# Patient Record
Sex: MALE | Race: WHITE | NOT HISPANIC OR LATINO | Employment: OTHER | URBAN - METROPOLITAN AREA
[De-identification: names, ages, dates, MRNs, and addresses within clinical notes are randomized per-mention and may not be internally consistent; named-entity substitution may affect disease eponyms.]

---

## 2017-11-30 PROBLEM — Z12.11 SCREENING FOR MALIGNANT NEOPLASM OF COLON: Status: ACTIVE | Noted: 2017-11-30

## 2019-09-09 ENCOUNTER — TELEPHONE (OUTPATIENT)
Dept: FAMILY MEDICINE | Facility: CLINIC | Age: 64
End: 2019-09-09

## 2019-09-09 DIAGNOSIS — E11.9 TYPE 2 DIABETES MELLITUS WITHOUT COMPLICATION, WITHOUT LONG-TERM CURRENT USE OF INSULIN: Primary | ICD-10-CM

## 2019-09-10 LAB
ALBUMIN SERPL-MCNC: 4.2 G/DL (ref 3.6–5.1)
ALBUMIN/GLOB SERPL: 1.3 (CALC) (ref 1–2.5)
ALP SERPL-CCNC: 63 U/L (ref 40–115)
ALT SERPL-CCNC: 30 U/L (ref 9–46)
AST SERPL-CCNC: 18 U/L (ref 10–35)
BASOPHILS # BLD AUTO: 38 CELLS/UL (ref 0–200)
BASOPHILS NFR BLD AUTO: 0.6 %
BILIRUB SERPL-MCNC: 0.7 MG/DL (ref 0.2–1.2)
BUN SERPL-MCNC: 13 MG/DL (ref 7–25)
BUN/CREAT SERPL: ABNORMAL (CALC) (ref 6–22)
CALCIUM SERPL-MCNC: 9.5 MG/DL (ref 8.6–10.3)
CHLORIDE SERPL-SCNC: 101 MMOL/L (ref 98–110)
CHOLEST SERPL-MCNC: 172 MG/DL
CHOLEST/HDLC SERPL: 3.7 (CALC)
CLIENT CONTACT:: NORMAL
CO2 SERPL-SCNC: 30 MMOL/L (ref 20–32)
COMMENT: NORMAL
CREAT SERPL-MCNC: 1.12 MG/DL (ref 0.7–1.25)
EOSINOPHIL # BLD AUTO: 139 CELLS/UL (ref 15–500)
EOSINOPHIL NFR BLD AUTO: 2.2 %
ERYTHROCYTE [DISTWIDTH] IN BLOOD BY AUTOMATED COUNT: 12.7 % (ref 11–15)
GFRSERPLBLD MDRD-ARVRAT: 69 ML/MIN/1.73M2
GLOBULIN SER CALC-MCNC: 3.2 G/DL (CALC) (ref 1.9–3.7)
GLUCOSE SERPL-MCNC: 163 MG/DL (ref 65–99)
HBA1C MFR BLD: 7.9 % OF TOTAL HGB
HCT VFR BLD AUTO: 48.3 % (ref 38.5–50)
HDLC SERPL-MCNC: 46 MG/DL
HGB BLD-MCNC: 16.2 G/DL (ref 13.2–17.1)
LDLC SERPL CALC-MCNC: 98 MG/DL (CALC)
LYMPHOCYTES # BLD AUTO: 1928 CELLS/UL (ref 850–3900)
LYMPHOCYTES NFR BLD AUTO: 30.6 %
Lab: NORMAL
MCH RBC QN AUTO: 30.6 PG (ref 27–33)
MCHC RBC AUTO-ENTMCNC: 33.5 G/DL (ref 32–36)
MCV RBC AUTO: 91.1 FL (ref 80–100)
MONOCYTES # BLD AUTO: 668 CELLS/UL (ref 200–950)
MONOCYTES NFR BLD AUTO: 10.6 %
NEUTROPHILS # BLD AUTO: 3528 CELLS/UL (ref 1500–7800)
NEUTROPHILS NFR BLD AUTO: 56 %
NONHDLC SERPL-MCNC: 126 MG/DL (CALC)
PLATELET # BLD AUTO: 217 THOUSAND/UL (ref 140–400)
PMV BLD REES-ECKER: 10.1 FL (ref 7.5–12.5)
POTASSIUM SERPL-SCNC: 4.5 MMOL/L (ref 3.5–5.3)
PROT SERPL-MCNC: 7.4 G/DL (ref 6.1–8.1)
RBC # BLD AUTO: 5.3 MILLION/UL (ref 4.2–5.8)
REF LAB TEST NAME: NORMAL
REF LAB TEST: NORMAL
SODIUM SERPL-SCNC: 137 MMOL/L (ref 135–146)
TRIGL SERPL-MCNC: 183 MG/DL
WBC # BLD AUTO: 6.3 THOUSAND/UL (ref 3.8–10.8)

## 2019-09-16 ENCOUNTER — TELEPHONE (OUTPATIENT)
Dept: FAMILY MEDICINE | Facility: CLINIC | Age: 64
End: 2019-09-16

## 2019-09-16 ENCOUNTER — OFFICE VISIT (OUTPATIENT)
Dept: FAMILY MEDICINE | Facility: CLINIC | Age: 64
End: 2019-09-16
Payer: MEDICARE

## 2019-09-16 VITALS
WEIGHT: 241 LBS | DIASTOLIC BLOOD PRESSURE: 80 MMHG | HEIGHT: 72 IN | SYSTOLIC BLOOD PRESSURE: 124 MMHG | HEART RATE: 68 BPM | BODY MASS INDEX: 32.64 KG/M2

## 2019-09-16 DIAGNOSIS — Z87.898 H/O MOTION SICKNESS: ICD-10-CM

## 2019-09-16 DIAGNOSIS — E78.2 MIXED HYPERLIPIDEMIA: ICD-10-CM

## 2019-09-16 DIAGNOSIS — E11.9 TYPE 2 DIABETES MELLITUS WITHOUT COMPLICATION, WITHOUT LONG-TERM CURRENT USE OF INSULIN: ICD-10-CM

## 2019-09-16 DIAGNOSIS — K21.9 GASTROESOPHAGEAL REFLUX DISEASE WITHOUT ESOPHAGITIS: ICD-10-CM

## 2019-09-16 PROCEDURE — 99214 OFFICE O/P EST MOD 30 MIN: CPT | Mod: S$GLB,,, | Performed by: FAMILY MEDICINE

## 2019-09-16 PROCEDURE — 99214 PR OFFICE/OUTPT VISIT, EST, LEVL IV, 30-39 MIN: ICD-10-PCS | Mod: S$GLB,,, | Performed by: FAMILY MEDICINE

## 2019-09-16 RX ORDER — INDOMETHACIN 50 MG/1
50 CAPSULE ORAL 2 TIMES DAILY
COMMUNITY
Start: 2018-02-28 | End: 2020-05-20 | Stop reason: SDUPTHER

## 2019-09-16 RX ORDER — METFORMIN HYDROCHLORIDE 500 MG/1
500 TABLET ORAL 2 TIMES DAILY WITH MEALS
Qty: 60 TABLET | Refills: 3 | Status: SHIPPED | OUTPATIENT
Start: 2019-09-16 | End: 2019-12-19 | Stop reason: SINTOL

## 2019-09-16 RX ORDER — SCOLOPAMINE TRANSDERMAL SYSTEM 1 MG/1
1 PATCH, EXTENDED RELEASE TRANSDERMAL
Qty: 4 PATCH | Refills: 1 | Status: SHIPPED | OUTPATIENT
Start: 2019-09-16 | End: 2020-03-04

## 2019-09-16 NOTE — TELEPHONE ENCOUNTER
Sent message to portal that we have received his lab results and will review at office visit this week.

## 2019-09-16 NOTE — PROGRESS NOTES
SUBJECTIVE:    Patient ID: Jacob Lou is a 63 y.o. male.    Chief Complaint: Follow-up    This 63-year-old male has just returned from a recent trip to [place].  He was eating plenty of carbs on this trip he states I love carbs he has a positive family history for diabetes mellitus.  In today's A1c has risen to 7.9.    He has had no recent gout attacks in the last 6 months.    He has a history of an old right calcaneal fracture but it only hurts occasionally and he does have to use a walking stick intermittently.      Orders Only on 09/09/2019   Component Date Value Ref Range Status    Cholesterol 09/09/2019 172  <200 mg/dL Final    HDL 09/09/2019 46  >40 mg/dL Final    Triglycerides 09/09/2019 183* <150 mg/dL Final    LDL Cholesterol 09/09/2019 98  mg/dL (calc) Final    Hdl/Cholesterol Ratio 09/09/2019 3.7  <5.0 (calc) Final    Non HDL Chol. (LDL+VLDL) 09/09/2019 126  <130 mg/dL (calc) Final    Glucose 09/09/2019 163* 65 - 99 mg/dL Final    BUN, Bld 09/09/2019 13  7 - 25 mg/dL Final    Creatinine 09/09/2019 1.12  0.70 - 1.25 mg/dL Final    eGFR if non African American 09/09/2019 69  > OR = 60 mL/min/1.73m2 Final    eGFR if African American 09/09/2019 79  > OR = 60 mL/min/1.73m2 Final    BUN/Creatinine Ratio 09/09/2019 NOT APPLICABLE  6 - 22 (calc) Final    Sodium 09/09/2019 137  135 - 146 mmol/L Final    Potassium 09/09/2019 4.5  3.5 - 5.3 mmol/L Final    Chloride 09/09/2019 101  98 - 110 mmol/L Final    CO2 09/09/2019 30  20 - 32 mmol/L Final    Calcium 09/09/2019 9.5  8.6 - 10.3 mg/dL Final    Total Protein 09/09/2019 7.4  6.1 - 8.1 g/dL Final    Albumin 09/09/2019 4.2  3.6 - 5.1 g/dL Final    Globulin, Total 09/09/2019 3.2  1.9 - 3.7 g/dL (calc) Final    Albumin/Globulin Ratio 09/09/2019 1.3  1.0 - 2.5 (calc) Final    Total Bilirubin 09/09/2019 0.7  0.2 - 1.2 mg/dL Final    Alkaline Phosphatase 09/09/2019 63  40 - 115 U/L Final    AST 09/09/2019 18  10 - 35 U/L Final     ALT 09/09/2019 30  9 - 46 U/L Final    WBC 09/09/2019 6.3  3.8 - 10.8 Thousand/uL Final    RBC 09/09/2019 5.30  4.20 - 5.80 Million/uL Final    Hemoglobin 09/09/2019 16.2  13.2 - 17.1 g/dL Final    Hematocrit 09/09/2019 48.3  38.5 - 50.0 % Final    Mean Corpuscular Volume 09/09/2019 91.1  80.0 - 100.0 fL Final    Mean Corpuscular Hemoglobin 09/09/2019 30.6  27.0 - 33.0 pg Final    Mean Corpuscular Hemoglobin Conc 09/09/2019 33.5  32.0 - 36.0 g/dL Final    RDW 09/09/2019 12.7  11.0 - 15.0 % Final    Platelets 09/09/2019 217  140 - 400 Thousand/uL Final    MPV 09/09/2019 10.1  7.5 - 12.5 fL Final    Neutrophils Absolute 09/09/2019 3,528  1,500 - 7,800 cells/uL Final    Lymph # 09/09/2019 1,928  850 - 3,900 cells/uL Final    Mono # 09/09/2019 668  200 - 950 cells/uL Final    Eos # 09/09/2019 139  15 - 500 cells/uL Final    Baso # 09/09/2019 38  0 - 200 cells/uL Final    Neutrophils Relative 09/09/2019 56  % Final    Lymph% 09/09/2019 30.6  % Final    Mono% 09/09/2019 10.6  % Final    Eosinophil% 09/09/2019 2.2  % Final    Basophil% 09/09/2019 0.6  % Final    Test(s) Ordered on Requisition 09/09/2019 CBC,CMP,A1C,LIPID   Final    Test Code: 09/09/2019 6399,91931,496,0549   Final    Client Contact: 09/09/2019 OMKAR JULIO   Final    Comments: 09/09/2019    Final    Comment 09/09/2019    Final    Hemoglobin A1C 09/09/2019 7.9* <5.7 % of total Hgb Final       Past Medical History:   Diagnosis Date    GERD (gastroesophageal reflux disease)     Hyperlipidemia      Past Surgical History:   Procedure Laterality Date    cataracts  2019    Dr MARYANN Martinez    COLONOSCOPY N/A 11/30/2017    Performed by Maikel Medina MD at [facility]    FOOT SURGERY      HERNIA REPAIR       Family History   Problem Relation Age of Onset    Diabetes Mother     Hypertension Father        Marital Status:   Alcohol History:  reports that he drinks about 0.6 oz of alcohol per week.  Tobacco History:  reports that he has  never smoked. He has never used smokeless tobacco.  Drug History:  reports that he does not use drugs.    Review of patient's allergies indicates:  No Known Allergies    Current Outpatient Medications:     indomethacin (INDOCIN) 50 MG capsule, Take 50 mg by mouth 2 (two) times daily., Disp: , Rfl:     ibuprofen (ADVIL,MOTRIN) 200 MG tablet, Take 200 mg by mouth every 8 (eight) hours as needed for Pain., Disp: , Rfl:     omeprazole (PRILOSEC) 20 MG capsule, Take 20 mg by mouth every other day., Disp: , Rfl:     rosuvastatin (CRESTOR) 10 MG tablet, Take 10 mg by mouth every other day., Disp: , Rfl:     Review of Systems   Constitutional: Negative for appetite change (I love  carbs!), chills, fatigue, fever and unexpected weight change.   HENT: Negative for congestion, ear pain and trouble swallowing.    Eyes: Negative for pain, discharge and visual disturbance.   Respiratory: Negative for apnea, cough, shortness of breath and wheezing.    Cardiovascular: Negative for chest pain and leg swelling.   Gastrointestinal: Negative for abdominal pain, blood in stool, constipation, diarrhea, nausea and vomiting.   Endocrine: Negative for cold intolerance, heat intolerance, polydipsia and polyuria.   Genitourinary: Negative for dysuria, hematuria, testicular pain and urgency.        Nocturia 1 x nite   Musculoskeletal: Negative for gait problem, joint swelling and myalgias.        Keeps a  Cane in his  Vehicle , rt heel post fracture pain   Neurological: Negative for dizziness, seizures and numbness.   Psychiatric/Behavioral: Negative for agitation, behavioral problems and hallucinations. The patient is not nervous/anxious.           Objective:      Vitals:    09/16/19 1114   BP: 124/80   Pulse: 68   Weight: 109.3 kg (241 lb)   Height: 6' (1.829 m)     Physical Exam   Constitutional: He is oriented to person, place, and time. He appears well-developed and well-nourished.   HENT:   Head: Normocephalic and atraumatic.   Right  Ear: External ear normal.   Left Ear: External ear normal.   Nose: Nose normal.   Mouth/Throat: Oropharynx is clear and moist.   Eyes: Pupils are equal, round, and reactive to light. EOM are normal.   Neck: Normal range of motion. Neck supple. Carotid bruit is not present. No thyromegaly present.   Cardiovascular: Normal rate, regular rhythm, normal heart sounds and intact distal pulses.   No murmur heard.  Pulmonary/Chest: Effort normal and breath sounds normal. He has no wheezes. He has no rales.   Abdominal: Soft. Bowel sounds are normal. He exhibits no distension. There is no hepatosplenomegaly. There is no tenderness.   Musculoskeletal: Normal range of motion. He exhibits no tenderness or deformity.        Lumbar back: Normal. He exhibits no pain and no spasm.   Bends 90 degrees at  waist   Lymphadenopathy:     He has no cervical adenopathy.   Neurological: He is alert and oriented to person, place, and time. No cranial nerve deficit. Coordination normal.   Skin: Skin is warm and dry. No rash noted.   Psychiatric: He has a normal mood and affect. His behavior is normal. Judgment and thought content normal.   Nursing note and vitals reviewed.        Assessment:       1. Type 2 diabetes mellitus without complication, without long-term current use of insulin    2. Mixed hyperlipidemia    3. H/O motion sickness    4. Gastroesophageal reflux disease without esophagitis         Plan:       Type 2 diabetes mellitus without complication, without long-term current use of insulin  A1c is 7.9.  We will add metformin 500 mg twice a day.  He is to reduce his carbs and tried lose 5 lb.  Return in 3 months for another A1c test  Mixed hyperlipidemia  Cholesterol is at goal at this time on rosuvastatin  H/O motion sickness  Scopolamine patches will be prescribed for an upcoming cruise  Gastroesophageal reflux disease without esophagitis  Controlled well on omeprazole    Follow up in about 3 months (around 12/16/2019) for  Diabetic Check-Up with PA/NP.

## 2019-09-16 NOTE — TELEPHONE ENCOUNTER
----- Message from Tyler Abel MD sent at 9/14/2019  3:24 PM CDT -----  Pt coming this week for  ov

## 2019-09-18 ENCOUNTER — PATIENT MESSAGE (OUTPATIENT)
Dept: FAMILY MEDICINE | Facility: CLINIC | Age: 64
End: 2019-09-18

## 2019-09-27 ENCOUNTER — PATIENT MESSAGE (OUTPATIENT)
Dept: FAMILY MEDICINE | Facility: CLINIC | Age: 64
End: 2019-09-27

## 2019-12-05 ENCOUNTER — TELEPHONE (OUTPATIENT)
Dept: FAMILY MEDICINE | Facility: CLINIC | Age: 64
End: 2019-12-05

## 2019-12-12 LAB
ALBUMIN SERPL-MCNC: 4.1 G/DL (ref 3.6–5.1)
ALBUMIN/GLOB SERPL: 1.3 (CALC) (ref 1–2.5)
ALP SERPL-CCNC: 60 U/L (ref 40–115)
ALT SERPL-CCNC: 42 U/L (ref 9–46)
AST SERPL-CCNC: 23 U/L (ref 10–35)
BASOPHILS # BLD AUTO: 0 CELLS/UL (ref 0–200)
BASOPHILS NFR BLD AUTO: 0 %
BILIRUB SERPL-MCNC: 0.7 MG/DL (ref 0.2–1.2)
BUN SERPL-MCNC: 9 MG/DL (ref 7–25)
BUN/CREAT SERPL: ABNORMAL (CALC) (ref 6–22)
CALCIUM SERPL-MCNC: 9.1 MG/DL (ref 8.6–10.3)
CHLORIDE SERPL-SCNC: 103 MMOL/L (ref 98–110)
CHOLEST SERPL-MCNC: 167 MG/DL
CHOLEST/HDLC SERPL: 4.1 (CALC)
CO2 SERPL-SCNC: 29 MMOL/L (ref 20–32)
CREAT SERPL-MCNC: 0.96 MG/DL (ref 0.7–1.25)
EOSINOPHIL # BLD AUTO: 118 CELLS/UL (ref 15–500)
EOSINOPHIL NFR BLD AUTO: 2 %
ERYTHROCYTE [DISTWIDTH] IN BLOOD BY AUTOMATED COUNT: 13 % (ref 11–15)
GFRSERPLBLD MDRD-ARVRAT: 83 ML/MIN/1.73M2
GLOBULIN SER CALC-MCNC: 3.1 G/DL (CALC) (ref 1.9–3.7)
GLUCOSE SERPL-MCNC: 156 MG/DL (ref 65–99)
HBA1C MFR BLD: 7.5 % OF TOTAL HGB
HCT VFR BLD AUTO: 43.5 % (ref 38.5–50)
HDLC SERPL-MCNC: 41 MG/DL
HGB BLD-MCNC: 15.3 G/DL (ref 13.2–17.1)
LDLC SERPL CALC-MCNC: 98 MG/DL (CALC)
LYMPHOCYTES # BLD AUTO: 2006 CELLS/UL (ref 850–3900)
LYMPHOCYTES NFR BLD AUTO: 34 %
MCH RBC QN AUTO: 31.5 PG (ref 27–33)
MCHC RBC AUTO-ENTMCNC: 35.2 G/DL (ref 32–36)
MCV RBC AUTO: 89.7 FL (ref 80–100)
MONOCYTES # BLD AUTO: 649 CELLS/UL (ref 200–950)
MONOCYTES NFR BLD AUTO: 11 %
NEUTROPHILS # BLD AUTO: 3127 CELLS/UL (ref 1500–7800)
NEUTROPHILS NFR BLD AUTO: 53 %
NONHDLC SERPL-MCNC: 126 MG/DL (CALC)
PATH REPORT.FINAL DX SPEC: NORMAL
PATHOLOGIST NAME: NORMAL
PLATELET # BLD AUTO: 232 THOUSAND/UL (ref 140–400)
PMV BLD REES-ECKER: 10.1 FL (ref 7.5–12.5)
POTASSIUM SERPL-SCNC: 4.5 MMOL/L (ref 3.5–5.3)
PROT SERPL-MCNC: 7.2 G/DL (ref 6.1–8.1)
RBC # BLD AUTO: 4.85 MILLION/UL (ref 4.2–5.8)
SERVICE CMNT-IMP: NORMAL
SODIUM SERPL-SCNC: 139 MMOL/L (ref 135–146)
SPECIMEN SOURCE: NORMAL
TRIGL SERPL-MCNC: 190 MG/DL
WBC # BLD AUTO: 5.9 THOUSAND/UL (ref 3.8–10.8)

## 2019-12-19 ENCOUNTER — OFFICE VISIT (OUTPATIENT)
Dept: FAMILY MEDICINE | Facility: CLINIC | Age: 64
End: 2019-12-19
Payer: MEDICARE

## 2019-12-19 VITALS
HEART RATE: 88 BPM | BODY MASS INDEX: 32.73 KG/M2 | WEIGHT: 241.63 LBS | DIASTOLIC BLOOD PRESSURE: 82 MMHG | HEIGHT: 72 IN | SYSTOLIC BLOOD PRESSURE: 126 MMHG

## 2019-12-19 DIAGNOSIS — K21.9 GASTROESOPHAGEAL REFLUX DISEASE WITHOUT ESOPHAGITIS: ICD-10-CM

## 2019-12-19 DIAGNOSIS — E78.2 MIXED HYPERLIPIDEMIA: ICD-10-CM

## 2019-12-19 DIAGNOSIS — E11.9 TYPE 2 DIABETES MELLITUS WITHOUT COMPLICATION, WITHOUT LONG-TERM CURRENT USE OF INSULIN: Primary | ICD-10-CM

## 2019-12-19 PROCEDURE — 99214 OFFICE O/P EST MOD 30 MIN: CPT | Mod: S$GLB,,, | Performed by: PHYSICIAN ASSISTANT

## 2019-12-19 PROCEDURE — 1159F PR MEDICATION LIST DOCUMENTED IN MEDICAL RECORD: ICD-10-PCS | Mod: S$GLB,,, | Performed by: PHYSICIAN ASSISTANT

## 2019-12-19 PROCEDURE — 1126F AMNT PAIN NOTED NONE PRSNT: CPT | Mod: S$GLB,,, | Performed by: PHYSICIAN ASSISTANT

## 2019-12-19 PROCEDURE — 1126F PR PAIN SEVERITY QUANTIFIED, NO PAIN PRESENT: ICD-10-PCS | Mod: S$GLB,,, | Performed by: PHYSICIAN ASSISTANT

## 2019-12-19 PROCEDURE — 99214 PR OFFICE/OUTPT VISIT, EST, LEVL IV, 30-39 MIN: ICD-10-PCS | Mod: S$GLB,,, | Performed by: PHYSICIAN ASSISTANT

## 2019-12-19 PROCEDURE — 1159F MED LIST DOCD IN RCRD: CPT | Mod: S$GLB,,, | Performed by: PHYSICIAN ASSISTANT

## 2019-12-19 RX ORDER — OMEPRAZOLE 20 MG/1
20 CAPSULE, DELAYED RELEASE ORAL EVERY OTHER DAY
Qty: 45 CAPSULE | Refills: 1 | Status: SHIPPED | OUTPATIENT
Start: 2019-12-19 | End: 2020-03-04 | Stop reason: SDUPTHER

## 2019-12-19 RX ORDER — METFORMIN HYDROCHLORIDE 750 MG/1
750 TABLET, EXTENDED RELEASE ORAL 2 TIMES DAILY WITH MEALS
Qty: 180 TABLET | Refills: 1 | Status: SHIPPED | OUTPATIENT
Start: 2019-12-19 | End: 2020-03-04 | Stop reason: SDUPTHER

## 2019-12-19 RX ORDER — ROSUVASTATIN CALCIUM 10 MG/1
10 TABLET, COATED ORAL EVERY OTHER DAY
Qty: 45 TABLET | Refills: 1 | Status: SHIPPED | OUTPATIENT
Start: 2019-12-19 | End: 2020-03-04 | Stop reason: SDUPTHER

## 2019-12-19 NOTE — PROGRESS NOTES
SUBJECTIVE:    Patient ID: Jacob Lou is a 64 y.o. male.    Chief Complaint: Diabetes (Diabetic check up....labs completed on 12/9/2019.....mlr)    64-year-old white male presents today for three-month diabetic checkup.  I reviewed Dr. Abel's note from September.  Metformin was added at low dose 500 mg b.i.d. and he was instructed to reduce carb intake and lose about 5 lb.  His A1c did reduce from 7.9-7.5%.  He is tolerating medication okay but does admit that some days he has some severe diarrhea sxs that he has associated with the  IR metformin.      Orders Only on 09/09/2019   Component Date Value Ref Range Status    Cholesterol 09/09/2019 172  <200 mg/dL Final    HDL 09/09/2019 46  >40 mg/dL Final    Triglycerides 09/09/2019 183* <150 mg/dL Final    LDL Cholesterol 09/09/2019 98  mg/dL (calc) Final    Hdl/Cholesterol Ratio 09/09/2019 3.7  <5.0 (calc) Final    Non HDL Chol. (LDL+VLDL) 09/09/2019 126  <130 mg/dL (calc) Final    Glucose 09/09/2019 163* 65 - 99 mg/dL Final    BUN, Bld 09/09/2019 13  7 - 25 mg/dL Final    Creatinine 09/09/2019 1.12  0.70 - 1.25 mg/dL Final    eGFR if non African American 09/09/2019 69  > OR = 60 mL/min/1.73m2 Final    eGFR if African American 09/09/2019 79  > OR = 60 mL/min/1.73m2 Final    BUN/Creatinine Ratio 09/09/2019 NOT APPLICABLE  6 - 22 (calc) Final    Sodium 09/09/2019 137  135 - 146 mmol/L Final    Potassium 09/09/2019 4.5  3.5 - 5.3 mmol/L Final    Chloride 09/09/2019 101  98 - 110 mmol/L Final    CO2 09/09/2019 30  20 - 32 mmol/L Final    Calcium 09/09/2019 9.5  8.6 - 10.3 mg/dL Final    Total Protein 09/09/2019 7.4  6.1 - 8.1 g/dL Final    Albumin 09/09/2019 4.2  3.6 - 5.1 g/dL Final    Globulin, Total 09/09/2019 3.2  1.9 - 3.7 g/dL (calc) Final    Albumin/Globulin Ratio 09/09/2019 1.3  1.0 - 2.5 (calc) Final    Total Bilirubin 09/09/2019 0.7  0.2 - 1.2 mg/dL Final    Alkaline Phosphatase 09/09/2019 63  40 - 115 U/L Final    AST 09/09/2019  18  10 - 35 U/L Final    ALT 09/09/2019 30  9 - 46 U/L Final    WBC 09/09/2019 6.3  3.8 - 10.8 Thousand/uL Final    RBC 09/09/2019 5.30  4.20 - 5.80 Million/uL Final    Hemoglobin 09/09/2019 16.2  13.2 - 17.1 g/dL Final    Hematocrit 09/09/2019 48.3  38.5 - 50.0 % Final    Mean Corpuscular Volume 09/09/2019 91.1  80.0 - 100.0 fL Final    Mean Corpuscular Hemoglobin 09/09/2019 30.6  27.0 - 33.0 pg Final    Mean Corpuscular Hemoglobin Conc 09/09/2019 33.5  32.0 - 36.0 g/dL Final    RDW 09/09/2019 12.7  11.0 - 15.0 % Final    Platelets 09/09/2019 217  140 - 400 Thousand/uL Final    MPV 09/09/2019 10.1  7.5 - 12.5 fL Final    Neutrophils Absolute 09/09/2019 3,528  1,500 - 7,800 cells/uL Final    Lymph # 09/09/2019 1,928  850 - 3,900 cells/uL Final    Mono # 09/09/2019 668  200 - 950 cells/uL Final    Eos # 09/09/2019 139  15 - 500 cells/uL Final    Baso # 09/09/2019 38  0 - 200 cells/uL Final    Neutrophils Relative 09/09/2019 56  % Final    Lymph% 09/09/2019 30.6  % Final    Mono% 09/09/2019 10.6  % Final    Eosinophil% 09/09/2019 2.2  % Final    Basophil% 09/09/2019 0.6  % Final    Test(s) Ordered on Requisition 09/09/2019 CBC,CMP,A1C,LIPID   Final    Test Code: 09/09/2019 6399,[id number],8150   Final    Client Contact: 09/09/2019 OMKAR JULIO   Final    Comments: 09/09/2019    Final    Comment 09/09/2019    Final    Hemoglobin A1C 09/09/2019 7.9* <5.7 % of total Hgb Final   Telephone on 09/09/2019   Component Date Value Ref Range Status    WBC 12/09/2019 5.9  3.8 - 10.8 Thousand/uL Final    RBC 12/09/2019 4.85  4.20 - 5.80 Million/uL Final    Hemoglobin 12/09/2019 15.3  13.2 - 17.1 g/dL Final    Hematocrit 12/09/2019 43.5  38.5 - 50.0 % Final    Mean Corpuscular Volume 12/09/2019 89.7  80.0 - 100.0 fL Final    Mean Corpuscular Hemoglobin 12/09/2019 31.5  27.0 - 33.0 pg Final    Mean Corpuscular Hemoglobin Conc 12/09/2019 35.2  32.0 - 36.0 g/dL Final    RDW 12/09/2019 13.0  11.0 - 15.0 %  Final    Platelets 12/09/2019 232  140 - 400 Thousand/uL Final    MPV 12/09/2019 10.1  7.5 - 12.5 fL Final    Neutrophils Absolute 12/09/2019 3,127  1,500 - 7,800 cells/uL Final    Lymph # 12/09/2019 2,006  850 - 3,900 cells/uL Final    Mono # 12/09/2019 649  200 - 950 cells/uL Final    Eos # 12/09/2019 118  15 - 500 cells/uL Final    Baso # 12/09/2019 0  0 - 200 cells/uL Final    Neutrophils Relative 12/09/2019 53  % Final    Lymph% 12/09/2019 34  % Final    Mono% 12/09/2019 11  % Final    Eosinophil% 12/09/2019 2  % Final    Basophil% 12/09/2019 0  % Final    Comment(s) 12/09/2019  Pathologist review will report under separate cover.   Final    Glucose 12/09/2019 156* 65 - 99 mg/dL Final    BUN, Bld 12/09/2019 9  7 - 25 mg/dL Final    Creatinine 12/09/2019 0.96  0.70 - 1.25 mg/dL Final    eGFR if non African American 12/09/2019 83  > OR = 60 mL/min/1.73m2 Final    eGFR if  12/09/2019 96  > OR = 60 mL/min/1.73m2 Final    BUN/Creatinine Ratio 12/09/2019 NOT APPLICABLE  6 - 22 (calc) Final    Sodium 12/09/2019 139  135 - 146 mmol/L Final    Potassium 12/09/2019 4.5  3.5 - 5.3 mmol/L Final    Chloride 12/09/2019 103  98 - 110 mmol/L Final    CO2 12/09/2019 29  20 - 32 mmol/L Final    Calcium 12/09/2019 9.1  8.6 - 10.3 mg/dL Final    Total Protein 12/09/2019 7.2  6.1 - 8.1 g/dL Final    Albumin 12/09/2019 4.1  3.6 - 5.1 g/dL Final    Globulin, Total 12/09/2019 3.1  1.9 - 3.7 g/dL (calc) Final    Albumin/Globulin Ratio 12/09/2019 1.3  1.0 - 2.5 (calc) Final    Total Bilirubin 12/09/2019 0.7  0.2 - 1.2 mg/dL Final    Alkaline Phosphatase 12/09/2019 60  40 - 115 U/L Final    AST 12/09/2019 23  10 - 35 U/L Final    ALT 12/09/2019 42  9 - 46 U/L Final    Hemoglobin A1C 12/09/2019 7.5* <5.7 % of total Hgb Final    Cholesterol 12/09/2019 167  <200 mg/dL Final    HDL 12/09/2019 41  >40 mg/dL Final    Triglycerides 12/09/2019 190* <150 mg/dL Final    LDL Cholesterol  12/09/2019 98  mg/dL (calc) Final    Hdl/Cholesterol Ratio 12/09/2019 4.1  <5.0 (calc) Final    Non HDL Chol. (LDL+VLDL) 12/09/2019 126  <130 mg/dL (calc) Final    Pathologist Name 12/09/2019 Massimo Mario MD, Board Certified in Anatomic Pathology, Clinical Pathology and Hematopathology [phone] (electronic signature)   Final    Source (Spec A) 12/09/2019 PATHOLOGIST REVIEW OF PERIPHERAL SMEAR   Final    Diagnosis (Spec A) 12/09/2019 NO MORPHOLOGIC ABNORMALITIES   Final       Past Medical History:   Diagnosis Date    GERD (gastroesophageal reflux disease)     Hyperlipidemia      Past Surgical History:   Procedure Laterality Date    cataracts  2019    Dr MARYANN Martinez    COLONOSCOPY N/A 11/30/2017    Procedure: COLONOSCOPY;  Surgeon: Maikel Medina MD;  [facility];  Service: Endoscopy;  Laterality: N/A;    FOOT SURGERY      HERNIA REPAIR       Family History   Problem Relation Age of Onset    Diabetes Mother     Hypertension Father        Marital Status:   Alcohol History:  reports that he drinks about 1.0 standard drinks of alcohol per week.  Tobacco History:  reports that he has never smoked. He has never used smokeless tobacco.  Drug History:  reports that he does not use drugs.    Review of patient's allergies indicates:  No Known Allergies    Current Outpatient Medications:     ibuprofen (ADVIL,MOTRIN) 200 MG tablet, Take 200 mg by mouth every 8 (eight) hours as needed for Pain., Disp: , Rfl:     indomethacin (INDOCIN) 50 MG capsule, Take 50 mg by mouth 2 (two) times daily., Disp: , Rfl:     metFORMIN (GLUCOPHAGE-XR) 750 MG 24 hr tablet, Take 1 tablet (750 mg total) by mouth 2 (two) times daily with meals., Disp: 180 tablet, Rfl: 1    omeprazole (PRILOSEC) 20 MG capsule, Take 1 capsule (20 mg total) by mouth every other day., Disp: 45 capsule, Rfl: 1    rosuvastatin (CRESTOR) 10 MG tablet, Take 1 tablet (10 mg total) by mouth every other day., Disp: 45 tablet, Rfl:  1    scopolamine (TRANSDERM-SCOP) 1.3-1.5 mg (1 mg over 3 days), Place 1 patch onto the skin every 72 hours., Disp: 4 patch, Rfl: 1    Review of Systems   Constitutional: Negative for activity change, fatigue, fever and unexpected weight change.   HENT: Negative for congestion.    Respiratory: Negative for apnea, cough, chest tightness and shortness of breath.    Cardiovascular: Negative for chest pain and palpitations.   Gastrointestinal: Positive for diarrhea (occasional). Negative for abdominal distention and abdominal pain.   Genitourinary: Negative for difficulty urinating and dysuria.   Musculoskeletal: Negative for arthralgias and back pain.   Neurological: Negative for dizziness and weakness.          Objective:      Vitals:    12/19/19 0956   BP: 126/82   Pulse: 88   Weight: 109.6 kg (241 lb 9.6 oz)   Height: 6' (1.829 m)     Physical Exam   Constitutional: He is oriented to person, place, and time. He appears well-developed and well-nourished. No distress.   HENT:   Head: Normocephalic and atraumatic.   Eyes: Pupils are equal, round, and reactive to light.   Neck: Normal range of motion. Neck supple. No thyromegaly present.   Cardiovascular: Normal rate, regular rhythm, normal heart sounds and intact distal pulses.   Pulmonary/Chest: Effort normal and breath sounds normal.   Abdominal: Soft. Bowel sounds are normal. He exhibits no distension. There is no tenderness.   Musculoskeletal: Normal range of motion.   Neurological: He is alert and oriented to person, place, and time. No cranial nerve deficit.   Skin: Skin is warm and dry. No rash noted. No erythema.         Assessment:       1. Type 2 diabetes mellitus without complication, without long-term current use of insulin    2. Mixed hyperlipidemia    3. Gastroesophageal reflux disease without esophagitis         Plan:       Type 2 diabetes mellitus without complication, without long-term current use of insulin  Comments:  Going to switch to metformin XR  for tolerability purposes. increase dose to 750.  Orders:  -     metFORMIN (GLUCOPHAGE-XR) 750 MG 24 hr tablet; Take 1 tablet (750 mg total) by mouth 2 (two) times daily with meals.  Dispense: 180 tablet; Refill: 1    Mixed hyperlipidemia  Comments:  refills as needed.  Orders:  -     rosuvastatin (CRESTOR) 10 MG tablet; Take 1 tablet (10 mg total) by mouth every other day.  Dispense: 45 tablet; Refill: 1    Gastroesophageal reflux disease without esophagitis  Comments:  Doing well with omeprazole. continue as is.  Orders:  -     omeprazole (PRILOSEC) 20 MG capsule; Take 1 capsule (20 mg total) by mouth every other day.  Dispense: 45 capsule; Refill: 1      Follow up in about 3 months (around 3/19/2020) for Diabetic Check-Up as scheduled.        12/19/2019 Leonard Stein PA-C

## 2019-12-19 NOTE — PATIENT INSTRUCTIONS
"  Exercise to Manage Your Blood Sugar    Being physically active every day can help you manage your blood sugar. Thats because an active lifestyle can improve your bodys ability to use insulin. Daily activity can also help delay or prevent complications of diabetes. And its a great way to relieve stress. If you arent normally active, be sure to consult your healthcare provider before getting started.  How much activity do you need?  If daily activity is new to you, start slow and steady. Begin with 10 minutes of activity each day. Then work up to at least 150 minutes a week of physical activity. Don't let more than 2 days go by without being active. When sitting for long periods of time, get up for short sessions of light activity every 30 minutes  Just move!  You dont have to join a gym or own pricey sports equipment. Just get out and walk. Walking is an aerobic exercise that makes your heart and lungs work hard. It helps your heart and blood vessels. Walking needs only a sturdy pair of sneakers and your own two feet. The more you walk, the easier it gets:  · Schedule time every day to move your feet.  · Make it part of your daily routine.  · Walk with a friend or a group to keep it interesting and fun.  · Try taking several short walks during the day to meet your daily activity goal.  A pedometer makes every step count  A pedometer is a small device that keeps track of how many steps you take. You can clip it to your belt (or a strap on your arm or leg) and go about your daily routine. "Smartphones" now also have apps to record your walking. At the end of the day, the pedometer shows the total number of steps you took. Use a pedometer to set daily goals for yourself. For instance, if you walk 4,000 steps a day, try adding 200 more steps each day. Aim for a goal of 7,500. With every step, youre doing a little more to help your body use insulin.   Adding resistance exercise  Resistance exercise (also called " strength training), makes muscles stronger. It also helps muscles use insulin better. Ask your healthcare provider whether this type of exercise is right for you. If it is, your healthcare provider can help you work it in to your activity plan.  Staying safe  Being active may cause blood sugar to drop faster than usual. This is especially true if you take medicine to manage your blood sugar. But there are things you can do to help reduce the risk of accidental lows. Keep these tips in mind:  · Always carry identification when you exercise outside your home. Carry a cell phone to use in case of emergency.  · If you can, include friends and family in your activities.  · Wear a medical ID bracelet that says you have diabetes.  · Use the right safety equipment for the activity you do (such as a bicycle helmet when you ride a bicycle outdoors). Wear closed-toed shoes that fit your feet well.  · Drink plenty of water before and during activity.  · Keep a fast-acting sugar (such as glucose tablets) on hand in case of low blood sugar.  · Dress properly for the weather. Wear a hat if its argelia, or wait until evening if its too hot.  · Avoid being active for long periods in very hot or very cold weather.  · Skip activity if youre sick.     Notice how activity affects blood sugar  Physical activity is important when you have diabetes. But you need to keep an eye on your blood sugar level. Check often if you have been active for longer than usual, or if the activity was unplanned. Make it a habit to check your blood sugar before being active. And check again a few hours later. Use your log book to write down how activity affects your numbers. If you take insulin, you may be able to adjust your dose before a planned activity. This can help prevent lows. You may also need to take a small carbohydrate snack before the exercise. Talk to your healthcare provider to learn more.    Date Last Reviewed: 6/1/2016  © 5606-0187 [employer]. [address] [place] [address], [place] [address]. All rights reserved. This information is not intended as a substitute for professional medical care. Always follow your healthcare professional's instructions.

## 2020-02-25 ENCOUNTER — PATIENT MESSAGE (OUTPATIENT)
Dept: FAMILY MEDICINE | Facility: CLINIC | Age: 65
End: 2020-02-25

## 2020-02-25 DIAGNOSIS — E11.9 TYPE 2 DIABETES MELLITUS WITHOUT COMPLICATION, WITHOUT LONG-TERM CURRENT USE OF INSULIN: ICD-10-CM

## 2020-02-25 DIAGNOSIS — E78.2 MIXED HYPERLIPIDEMIA: ICD-10-CM

## 2020-02-25 DIAGNOSIS — Z00.00 ROUTINE GENERAL MEDICAL EXAMINATION AT A HEALTH CARE FACILITY: Primary | ICD-10-CM

## 2020-03-04 ENCOUNTER — OFFICE VISIT (OUTPATIENT)
Dept: FAMILY MEDICINE | Facility: CLINIC | Age: 65
End: 2020-03-04
Payer: MEDICARE

## 2020-03-04 VITALS
WEIGHT: 240 LBS | SYSTOLIC BLOOD PRESSURE: 146 MMHG | HEIGHT: 72 IN | HEART RATE: 76 BPM | BODY MASS INDEX: 32.51 KG/M2 | DIASTOLIC BLOOD PRESSURE: 88 MMHG

## 2020-03-04 DIAGNOSIS — E11.9 TYPE 2 DIABETES MELLITUS WITHOUT COMPLICATION, WITHOUT LONG-TERM CURRENT USE OF INSULIN: Primary | ICD-10-CM

## 2020-03-04 DIAGNOSIS — N52.01 ERECTILE DYSFUNCTION DUE TO ARTERIAL INSUFFICIENCY: ICD-10-CM

## 2020-03-04 DIAGNOSIS — Z23 NEED FOR VACCINATION: ICD-10-CM

## 2020-03-04 DIAGNOSIS — K21.9 GASTROESOPHAGEAL REFLUX DISEASE WITHOUT ESOPHAGITIS: ICD-10-CM

## 2020-03-04 DIAGNOSIS — M19.071 ARTHRITIS OF RIGHT ANKLE: ICD-10-CM

## 2020-03-04 DIAGNOSIS — E78.2 MIXED HYPERLIPIDEMIA: ICD-10-CM

## 2020-03-04 LAB
ALBUMIN SERPL-MCNC: 4.5 G/DL (ref 3.6–5.1)
ALBUMIN/CREAT UR: 4 MCG/MG CREAT
ALBUMIN/GLOB SERPL: 1.5 (CALC) (ref 1–2.5)
ALP SERPL-CCNC: 51 U/L (ref 35–144)
ALT SERPL-CCNC: 40 U/L (ref 9–46)
APPEARANCE UR: CLEAR
AST SERPL-CCNC: 26 U/L (ref 10–35)
BACTERIA #/AREA URNS HPF: ABNORMAL /HPF
BACTERIA UR CULT: ABNORMAL
BACTERIA UR CULT: NORMAL
BASOPHILS # BLD AUTO: 40 CELLS/UL (ref 0–200)
BASOPHILS NFR BLD AUTO: 0.7 %
BILIRUB SERPL-MCNC: 0.7 MG/DL (ref 0.2–1.2)
BILIRUB UR QL STRIP: NEGATIVE
BUN SERPL-MCNC: 12 MG/DL (ref 7–25)
BUN/CREAT SERPL: ABNORMAL (CALC) (ref 6–22)
CALCIUM SERPL-MCNC: 9.8 MG/DL (ref 8.6–10.3)
CHLORIDE SERPL-SCNC: 100 MMOL/L (ref 98–110)
CHOLEST SERPL-MCNC: 185 MG/DL
CHOLEST/HDLC SERPL: 4.5 (CALC)
CO2 SERPL-SCNC: 28 MMOL/L (ref 20–32)
COLOR UR: YELLOW
CREAT SERPL-MCNC: 1.08 MG/DL (ref 0.7–1.25)
CREAT UR-MCNC: 156 MG/DL (ref 20–320)
EOSINOPHIL # BLD AUTO: 160 CELLS/UL (ref 15–500)
EOSINOPHIL NFR BLD AUTO: 2.8 %
ERYTHROCYTE [DISTWIDTH] IN BLOOD BY AUTOMATED COUNT: 13.1 % (ref 11–15)
GFRSERPLBLD MDRD-ARVRAT: 71 ML/MIN/1.73M2
GLOBULIN SER CALC-MCNC: 3.1 G/DL (CALC) (ref 1.9–3.7)
GLUCOSE SERPL-MCNC: 171 MG/DL (ref 65–99)
GLUCOSE UR QL STRIP: NEGATIVE
HBA1C MFR BLD: 7.2 % OF TOTAL HGB
HCT VFR BLD AUTO: 47.8 % (ref 38.5–50)
HDLC SERPL-MCNC: 41 MG/DL
HGB BLD-MCNC: 16.2 G/DL (ref 13.2–17.1)
HGB UR QL STRIP: NEGATIVE
HYALINE CASTS #/AREA URNS LPF: ABNORMAL /LPF
KETONES UR QL STRIP: NEGATIVE
LDLC SERPL CALC-MCNC: 110 MG/DL (CALC)
LEUKOCYTE ESTERASE UR QL STRIP: ABNORMAL
LYMPHOCYTES # BLD AUTO: 1778 CELLS/UL (ref 850–3900)
LYMPHOCYTES NFR BLD AUTO: 31.2 %
MCH RBC QN AUTO: 30.5 PG (ref 27–33)
MCHC RBC AUTO-ENTMCNC: 33.9 G/DL (ref 32–36)
MCV RBC AUTO: 89.8 FL (ref 80–100)
MICROALBUMIN UR-MCNC: 0.7 MG/DL
MONOCYTES # BLD AUTO: 656 CELLS/UL (ref 200–950)
MONOCYTES NFR BLD AUTO: 11.5 %
NEUTROPHILS # BLD AUTO: 3067 CELLS/UL (ref 1500–7800)
NEUTROPHILS NFR BLD AUTO: 53.8 %
NITRITE UR QL STRIP: NEGATIVE
NONHDLC SERPL-MCNC: 144 MG/DL (CALC)
PH UR STRIP: 6.5 [PH] (ref 5–8)
PLATELET # BLD AUTO: 225 THOUSAND/UL (ref 140–400)
PMV BLD REES-ECKER: 9.8 FL (ref 7.5–12.5)
POTASSIUM SERPL-SCNC: 4.7 MMOL/L (ref 3.5–5.3)
PROT SERPL-MCNC: 7.6 G/DL (ref 6.1–8.1)
PROT UR QL STRIP: NEGATIVE
RBC # BLD AUTO: 5.32 MILLION/UL (ref 4.2–5.8)
RBC #/AREA URNS HPF: ABNORMAL /HPF
SODIUM SERPL-SCNC: 138 MMOL/L (ref 135–146)
SP GR UR STRIP: 1.02 (ref 1–1.03)
SQUAMOUS #/AREA URNS HPF: ABNORMAL /HPF
TRIGL SERPL-MCNC: 224 MG/DL
TSH SERPL-ACNC: 1.69 MIU/L (ref 0.4–4.5)
WBC # BLD AUTO: 5.7 THOUSAND/UL (ref 3.8–10.8)
WBC #/AREA URNS HPF: ABNORMAL /HPF

## 2020-03-04 PROCEDURE — G0009 PNEUMOCOCCAL POLYSACCHARIDE VACCINE 23-VALENT =>2YO SQ IM: ICD-10-PCS | Mod: S$GLB,,, | Performed by: FAMILY MEDICINE

## 2020-03-04 PROCEDURE — G0009 ADMIN PNEUMOCOCCAL VACCINE: HCPCS | Mod: S$GLB,,, | Performed by: FAMILY MEDICINE

## 2020-03-04 PROCEDURE — 99214 OFFICE O/P EST MOD 30 MIN: CPT | Mod: 25,S$GLB,, | Performed by: FAMILY MEDICINE

## 2020-03-04 PROCEDURE — 90732 PNEUMOCOCCAL POLYSACCHARIDE VACCINE 23-VALENT =>2YO SQ IM: ICD-10-PCS | Mod: S$GLB,,, | Performed by: FAMILY MEDICINE

## 2020-03-04 PROCEDURE — 99214 PR OFFICE/OUTPT VISIT, EST, LEVL IV, 30-39 MIN: ICD-10-PCS | Mod: 25,S$GLB,, | Performed by: FAMILY MEDICINE

## 2020-03-04 PROCEDURE — 90732 PPSV23 VACC 2 YRS+ SUBQ/IM: CPT | Mod: S$GLB,,, | Performed by: FAMILY MEDICINE

## 2020-03-04 RX ORDER — ROSUVASTATIN CALCIUM 10 MG/1
10 TABLET, COATED ORAL EVERY OTHER DAY
Qty: 45 TABLET | Refills: 1 | Status: SHIPPED | OUTPATIENT
Start: 2020-03-04 | End: 2020-05-20 | Stop reason: SDUPTHER

## 2020-03-04 RX ORDER — METFORMIN HYDROCHLORIDE 750 MG/1
750 TABLET, EXTENDED RELEASE ORAL 2 TIMES DAILY WITH MEALS
Qty: 180 TABLET | Refills: 1 | Status: SHIPPED | OUTPATIENT
Start: 2020-03-04 | End: 2020-05-20 | Stop reason: SDUPTHER

## 2020-03-04 RX ORDER — OMEPRAZOLE 20 MG/1
20 CAPSULE, DELAYED RELEASE ORAL EVERY OTHER DAY
Qty: 45 CAPSULE | Refills: 1 | Status: SHIPPED | OUTPATIENT
Start: 2020-03-04 | End: 2020-05-20 | Stop reason: SDUPTHER

## 2020-03-04 RX ORDER — SILDENAFIL CITRATE 20 MG/1
TABLET ORAL
Qty: 30 TABLET | Refills: 5 | Status: SHIPPED | OUTPATIENT
Start: 2020-03-04 | End: 2022-03-25

## 2020-03-04 NOTE — LETTER
[address] [facility] [address]  [place] [address]  Phone: [phone]   [phone]                        MD Makeda Rincon MD Chequita Williams, MD Matthew Bassett, SNEHAL Moreno, TENZIN Galeana, TENZNI      Date: 03/04/2020        Patient: Jacob Lou  YOB: 1955    Please fax a copy of pt's recent eye exam.         Sincerely,     Mariela Jimenez MA

## 2020-03-04 NOTE — PROGRESS NOTES
SUBJECTIVE:    Patient ID: Jacob Lou is a 64 y.o. male.    Chief Complaint: Diabetes (brought bottles // Eye exam- Dr. Anderson // agrees to pna 23. ac)    This 64-year-old male has type 2 diabetes.  He is concerned about his cardiac health as several of his friends have had recent MI/bypasses/death.  He has never had a stress test, he has never had any chest pains.  His father had CABG at age 64.  Mother had diabetes.    He has some chronic right foot aches intermittently with barometric pressure changes.  He had a right foot reconstruction in 2006.  He has plates and screws in place.  Ibuprofen helps these periodic pains.      Patient Message on 02/25/2020   Component Date Value Ref Range Status    WBC 03/02/2020 5.7  3.8 - 10.8 Thousand/uL Final    RBC 03/02/2020 5.32  4.20 - 5.80 Million/uL Final    Hemoglobin 03/02/2020 16.2  13.2 - 17.1 g/dL Final    Hematocrit 03/02/2020 47.8  38.5 - 50.0 % Final    Mean Corpuscular Volume 03/02/2020 89.8  80.0 - 100.0 fL Final    Mean Corpuscular Hemoglobin 03/02/2020 30.5  27.0 - 33.0 pg Final    Mean Corpuscular Hemoglobin Conc 03/02/2020 33.9  32.0 - 36.0 g/dL Final    RDW 03/02/2020 13.1  11.0 - 15.0 % Final    Platelets 03/02/2020 225  140 - 400 Thousand/uL Final    MPV 03/02/2020 9.8  7.5 - 12.5 fL Final    Neutrophils Absolute 03/02/2020 3,067  1,500 - 7,800 cells/uL Final    Lymph # 03/02/2020 1,778  850 - 3,900 cells/uL Final    Mono # 03/02/2020 656  200 - 950 cells/uL Final    Eos # 03/02/2020 160  15 - 500 cells/uL Final    Baso # 03/02/2020 40  0 - 200 cells/uL Final    Neutrophils Relative 03/02/2020 53.8  % Final    Lymph% 03/02/2020 31.2  % Final    Mono% 03/02/2020 11.5  % Final    Eosinophil% 03/02/2020 2.8  % Final    Basophil% 03/02/2020 0.7  % Final    Glucose 03/02/2020 171* 65 - 99 mg/dL Final    BUN, Bld 03/02/2020 12  7 - 25 mg/dL Final    Creatinine 03/02/2020 1.08  0.70 - 1.25 mg/dL Final    eGFR if non African  American 03/02/2020 71  > OR = 60 mL/min/1.73m2 Final    eGFR if African American 03/02/2020 82  > OR = 60 mL/min/1.73m2 Final    BUN/Creatinine Ratio 03/02/2020 NOT APPLICABLE  6 - 22 (calc) Final    Sodium 03/02/2020 138  135 - 146 mmol/L Final    Potassium 03/02/2020 4.7  3.5 - 5.3 mmol/L Final    Chloride 03/02/2020 100  98 - 110 mmol/L Final    CO2 03/02/2020 28  20 - 32 mmol/L Final    Calcium 03/02/2020 9.8  8.6 - 10.3 mg/dL Final    Total Protein 03/02/2020 7.6  6.1 - 8.1 g/dL Final    Albumin 03/02/2020 4.5  3.6 - 5.1 g/dL Final    Globulin, Total 03/02/2020 3.1  1.9 - 3.7 g/dL (calc) Final    Albumin/Globulin Ratio 03/02/2020 1.5  1.0 - 2.5 (calc) Final    Total Bilirubin 03/02/2020 0.7  0.2 - 1.2 mg/dL Final    Alkaline Phosphatase 03/02/2020 51  35 - 144 U/L Final    AST 03/02/2020 26  10 - 35 U/L Final    ALT 03/02/2020 40  9 - 46 U/L Final    Cholesterol 03/02/2020 185  <200 mg/dL Final    HDL 03/02/2020 41  > OR = 40 mg/dL Final    Triglycerides 03/02/2020 224* <150 mg/dL Final    LDL Cholesterol 03/02/2020 110* mg/dL (calc) Final    Hdl/Cholesterol Ratio 03/02/2020 4.5  <5.0 (calc) Final    Non HDL Chol. (LDL+VLDL) 03/02/2020 144* <130 mg/dL (calc) Final    Creatinine, Random Ur 03/02/2020 156  20 - 320 mg/dL Final    Microalb, Ur 03/02/2020 0.7  See Note: mg/dL Final    Microalb Creat Ratio 03/02/2020 4  <30 mcg/mg creat Final    TSH 03/02/2020 1.69  0.40 - 4.50 mIU/L Final    Hemoglobin A1C 03/02/2020 7.2* <5.7 % of total Hgb Final    Color, UA 03/02/2020 YELLOW  YELLOW Final    Appearance, UA 03/02/2020 CLEAR  CLEAR Final    Specific Gravity, UA 03/02/2020 1.020  1.001 - 1.035 Final    pH, UA 03/02/2020 6.5  5.0 - 8.0 Final    Glucose, UA 03/02/2020 NEGATIVE  NEGATIVE Final    Bilirubin, UA 03/02/2020 NEGATIVE  NEGATIVE Final    Ketones, UA 03/02/2020 NEGATIVE  NEGATIVE Final    Occult Blood UA 03/02/2020 NEGATIVE  NEGATIVE Final    Protein, UA 03/02/2020  NEGATIVE  NEGATIVE Final    Nitrite, UA 03/02/2020 NEGATIVE  NEGATIVE Final    Leukocytes, UA 03/02/2020 TRACE* NEGATIVE Final    WBC Casts, UA 03/02/2020 0-5  < OR = 5 /HPF Final    RBC Casts, UA 03/02/2020 NONE SEEN  < OR = 2 /HPF Final    Squam Epithel, UA 03/02/2020 NONE SEEN  < OR = 5 /HPF Final    Bacteria, UA 03/02/2020 NONE SEEN  NONE SEEN /HPF Final    Hyaline Casts, UA 03/02/2020 NONE SEEN  NONE SEEN /LPF Final    Reflexive Urine Culture 03/02/2020 CULTURE INDICATED - RESULTS TO FOLLOW   Final    Urine Culture, Routine 03/02/2020    Final   Orders Only on 09/09/2019   Component Date Value Ref Range Status    Cholesterol 09/09/2019 172  <200 mg/dL Final    HDL 09/09/2019 46  >40 mg/dL Final    Triglycerides 09/09/2019 183* <150 mg/dL Final    LDL Cholesterol 09/09/2019 98  mg/dL (calc) Final    Hdl/Cholesterol Ratio 09/09/2019 3.7  <5.0 (calc) Final    Non HDL Chol. (LDL+VLDL) 09/09/2019 126  <130 mg/dL (calc) Final    Glucose 09/09/2019 163* 65 - 99 mg/dL Final    BUN, Bld 09/09/2019 13  7 - 25 mg/dL Final    Creatinine 09/09/2019 1.12  0.70 - 1.25 mg/dL Final    eGFR if non African American 09/09/2019 69  > OR = 60 mL/min/1.73m2 Final    eGFR if African American 09/09/2019 79  > OR = 60 mL/min/1.73m2 Final    BUN/Creatinine Ratio 09/09/2019 NOT APPLICABLE  6 - 22 (calc) Final    Sodium 09/09/2019 137  135 - 146 mmol/L Final    Potassium 09/09/2019 4.5  3.5 - 5.3 mmol/L Final    Chloride 09/09/2019 101  98 - 110 mmol/L Final    CO2 09/09/2019 30  20 - 32 mmol/L Final    Calcium 09/09/2019 9.5  8.6 - 10.3 mg/dL Final    Total Protein 09/09/2019 7.4  6.1 - 8.1 g/dL Final    Albumin 09/09/2019 4.2  3.6 - 5.1 g/dL Final    Globulin, Total 09/09/2019 3.2  1.9 - 3.7 g/dL (calc) Final    Albumin/Globulin Ratio 09/09/2019 1.3  1.0 - 2.5 (calc) Final    Total Bilirubin 09/09/2019 0.7  0.2 - 1.2 mg/dL Final    Alkaline Phosphatase 09/09/2019 63  40 - 115 U/L Final    AST 09/09/2019  18  10 - 35 U/L Final    ALT 09/09/2019 30  9 - 46 U/L Final    WBC 09/09/2019 6.3  3.8 - 10.8 Thousand/uL Final    RBC 09/09/2019 5.30  4.20 - 5.80 Million/uL Final    Hemoglobin 09/09/2019 16.2  13.2 - 17.1 g/dL Final    Hematocrit 09/09/2019 48.3  38.5 - 50.0 % Final    Mean Corpuscular Volume 09/09/2019 91.1  80.0 - 100.0 fL Final    Mean Corpuscular Hemoglobin 09/09/2019 30.6  27.0 - 33.0 pg Final    Mean Corpuscular Hemoglobin Conc 09/09/2019 33.5  32.0 - 36.0 g/dL Final    RDW 09/09/2019 12.7  11.0 - 15.0 % Final    Platelets 09/09/2019 217  140 - 400 Thousand/uL Final    MPV 09/09/2019 10.1  7.5 - 12.5 fL Final    Neutrophils Absolute 09/09/2019 3,528  1,500 - 7,800 cells/uL Final    Lymph # 09/09/2019 1,928  850 - 3,900 cells/uL Final    Mono # 09/09/2019 668  200 - 950 cells/uL Final    Eos # 09/09/2019 139  15 - 500 cells/uL Final    Baso # 09/09/2019 38  0 - 200 cells/uL Final    Neutrophils Relative 09/09/2019 56  % Final    Lymph% 09/09/2019 30.6  % Final    Mono% 09/09/2019 10.6  % Final    Eosinophil% 09/09/2019 2.2  % Final    Basophil% 09/09/2019 0.6  % Final    Test(s) Ordered on Requisition 09/09/2019 CBC,CMP,A1C,LIPID   Final    Test Code: 09/09/2019 6399,30385,620,6114   Final    Client Contact: 09/09/2019 OMKAR JULIO   Final    Comments: 09/09/2019    Final    Comment 09/09/2019    Final    Hemoglobin A1C 09/09/2019 7.9* <5.7 % of total Hgb Final   Telephone on 09/09/2019   Component Date Value Ref Range Status    WBC 12/09/2019 5.9  3.8 - 10.8 Thousand/uL Final    RBC 12/09/2019 4.85  4.20 - 5.80 Million/uL Final    Hemoglobin 12/09/2019 15.3  13.2 - 17.1 g/dL Final    Hematocrit 12/09/2019 43.5  38.5 - 50.0 % Final    Mean Corpuscular Volume 12/09/2019 89.7  80.0 - 100.0 fL Final    Mean Corpuscular Hemoglobin 12/09/2019 31.5  27.0 - 33.0 pg Final    Mean Corpuscular Hemoglobin Conc 12/09/2019 35.2  32.0 - 36.0 g/dL Final    RDW 12/09/2019 13.0  11.0 - 15.0 %  Final    Platelets 12/09/2019 232  140 - 400 Thousand/uL Final    MPV 12/09/2019 10.1  7.5 - 12.5 fL Final    Neutrophils Absolute 12/09/2019 3,127  1,500 - 7,800 cells/uL Final    Lymph # 12/09/2019 2,006  850 - 3,900 cells/uL Final    Mono # 12/09/2019 649  200 - 950 cells/uL Final    Eos # 12/09/2019 118  15 - 500 cells/uL Final    Baso # 12/09/2019 0  0 - 200 cells/uL Final    Neutrophils Relative 12/09/2019 53  % Final    Lymph% 12/09/2019 34  % Final    Mono% 12/09/2019 11  % Final    Eosinophil% 12/09/2019 2  % Final    Basophil% 12/09/2019 0  % Final    Comment(s) 12/09/2019  Pathologist review will report under separate cover.   Final    Glucose 12/09/2019 156* 65 - 99 mg/dL Final    BUN, Bld 12/09/2019 9  7 - 25 mg/dL Final    Creatinine 12/09/2019 0.96  0.70 - 1.25 mg/dL Final    eGFR if non African American 12/09/2019 83  > OR = 60 mL/min/1.73m2 Final    eGFR if  12/09/2019 96  > OR = 60 mL/min/1.73m2 Final    BUN/Creatinine Ratio 12/09/2019 NOT APPLICABLE  6 - 22 (calc) Final    Sodium 12/09/2019 139  135 - 146 mmol/L Final    Potassium 12/09/2019 4.5  3.5 - 5.3 mmol/L Final    Chloride 12/09/2019 103  98 - 110 mmol/L Final    CO2 12/09/2019 29  20 - 32 mmol/L Final    Calcium 12/09/2019 9.1  8.6 - 10.3 mg/dL Final    Total Protein 12/09/2019 7.2  6.1 - 8.1 g/dL Final    Albumin 12/09/2019 4.1  3.6 - 5.1 g/dL Final    Globulin, Total 12/09/2019 3.1  1.9 - 3.7 g/dL (calc) Final    Albumin/Globulin Ratio 12/09/2019 1.3  1.0 - 2.5 (calc) Final    Total Bilirubin 12/09/2019 0.7  0.2 - 1.2 mg/dL Final    Alkaline Phosphatase 12/09/2019 60  40 - 115 U/L Final    AST 12/09/2019 23  10 - 35 U/L Final    ALT 12/09/2019 42  9 - 46 U/L Final    Hemoglobin A1C 12/09/2019 7.5* <5.7 % of total Hgb Final    Cholesterol 12/09/2019 167  <200 mg/dL Final    HDL 12/09/2019 41  >40 mg/dL Final    Triglycerides 12/09/2019 190* <150 mg/dL Final    LDL Cholesterol  12/09/2019 98  mg/dL (calc) Final    Hdl/Cholesterol Ratio 12/09/2019 4.1  <5.0 (calc) Final    Non HDL Chol. (LDL+VLDL) 12/09/2019 126  <130 mg/dL (calc) Final    Pathologist Name 12/09/2019 Massimo Mario MD, Board Certified in Anatomic Pathology, Clinical Pathology and Hematopathology [phone] (electronic signature)   Final    Source (Spec A) 12/09/2019 PATHOLOGIST REVIEW OF PERIPHERAL SMEAR   Final    Diagnosis (Spec A) 12/09/2019 NO MORPHOLOGIC ABNORMALITIES   Final       Past Medical History:   Diagnosis Date    GERD (gastroesophageal reflux disease)     Hyperlipidemia      Past Surgical History:   Procedure Laterality Date    cataracts  2019    Dr MARYANN Martinez    COLONOSCOPY N/A 11/30/2017    Procedure: COLONOSCOPY;  Surgeon: Miakel Medina MD;  Location: [facility];  Service: Endoscopy;  Laterality: N/A;    FOOT SURGERY      HERNIA REPAIR       Family History   Problem Relation Age of Onset    Diabetes Mother     Hypertension Father        Marital Status:   Alcohol History:  reports that he drinks about 1.0 standard drinks of alcohol per week.  Tobacco History:  reports that he has never smoked. He has never used smokeless tobacco.  Drug History:  reports that he does not use drugs.    Review of patient's allergies indicates:  No Known Allergies    Current Outpatient Medications:     metFORMIN (GLUCOPHAGE-XR) 750 MG XR 24hr tablet, Take 1 tablet (750 mg total) by mouth 2 (two) times daily with meals., Disp: 180 tablet, Rfl: 1    omeprazole (PRILOSEC) 20 MG capsule, Take 1 capsule (20 mg total) by mouth every other day., Disp: 45 capsule, Rfl: 1    rosuvastatin (CRESTOR) 10 MG tablet, Take 1 tablet (10 mg total) by mouth every other day., Disp: 45 tablet, Rfl: 1    ibuprofen (ADVIL,MOTRIN) 200 MG tablet, Take 200 mg by mouth every 8 (eight) hours as needed for Pain., Disp: , Rfl:     indomethacin (INDOCIN) 50 MG capsule, Take 50 mg by mouth 2 (two) times daily., Disp: , Rfl:      Review of Systems   Constitutional: Negative for appetite change, chills, fatigue, fever and unexpected weight change.   HENT: Negative for congestion, ear pain and trouble swallowing.    Eyes: Negative for pain, discharge and visual disturbance.   Respiratory: Negative for apnea, cough, shortness of breath and wheezing.         Never smoked, solid JEANIE uses CPAP at night.faithfully   Cardiovascular: Negative for chest pain and leg swelling.   Gastrointestinal: Negative for abdominal pain, blood in stool, constipation, diarrhea, nausea and vomiting.        Omeprazole 20 mg every other day works well for acid   Endocrine: Negative for cold intolerance, heat intolerance and polydipsia.   Genitourinary: Negative for dysuria, hematuria, testicular pain and urgency.        Nocturia 1 time a night   Musculoskeletal: Positive for arthralgias (Bear mid to pressure causes right foot to ache, post reconstruction in 2006). Negative for back pain, gait problem, joint swelling, myalgias and neck pain.   Skin: Positive for wound (Dr. 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   Neurological: Negative for dizziness, seizures and numbness.   Psychiatric/Behavioral: Negative for agitation, behavioral problems and hallucinations. The patient is not nervous/anxious.           Objective:      Vitals:    03/04/20 0857   BP: (!) 146/88   Pulse: 76   Weight: 108.9 kg (240 lb)   Height: 6' (1.829 m)     Body mass index is 32.55 kg/m².  Physical Exam   Constitutional: He is oriented to person, place, and time. He appears well-developed and well-nourished.   HENT:   Head: Normocephalic and atraumatic.   Right Ear: External ear normal.   Left Ear: External ear normal.   Nose: Nose normal.   Mouth/Throat: Oropharynx is clear and moist.   Eyes: Pupils are equal, round, and reactive to light. EOM are normal.   Neck: Normal range of motion. Neck supple. Carotid bruit is not present. No thyromegaly present.   Cardiovascular:  Normal rate, regular rhythm, normal heart sounds and intact distal pulses.   No murmur heard.  Pulses:       Dorsalis pedis pulses are 1+ on the right side, and 1+ on the left side.        Posterior tibial pulses are 1+ on the right side, and 1+ on the left side.   Pulmonary/Chest: Effort normal and breath sounds normal. He has no wheezes. He has no rales.   Abdominal: Soft. Bowel sounds are normal. He exhibits no distension. There is no hepatosplenomegaly. There is no tenderness.   Musculoskeletal: Normal range of motion. He exhibits no tenderness or deformity.        Lumbar back: Normal. He exhibits no pain and no spasm.   Bends 90 degrees at  waist right ankle and foot have moderate deformity.  Decreased flexion in that ankle.  Shoulders have full range of motion however knees are good range of motion without crepitance no peripheral edema is present   Feet:   Right Foot:   Protective Sensation: 5 sites tested. 5 sites sensed.   Skin Integrity: Negative for callus.   Left Foot:   Protective Sensation: 5 sites tested. 5 sites sensed.   Skin Integrity: Negative for callus.   Lymphadenopathy:     He has no cervical adenopathy.   Neurological: He is alert and oriented to person, place, and time. No cranial nerve deficit. Coordination normal.   Skin: Skin is warm and dry. No rash noted.   Psychiatric: He has a normal mood and affect. His behavior is normal. Judgment and thought content normal.   Nursing note and vitals reviewed.        Assessment:       1. Type 2 diabetes mellitus without complication, without long-term current use of insulin    2. Need for vaccination    3. Mixed hyperlipidemia    4. Gastroesophageal reflux disease without esophagitis    5. Arthritis of right ankle    6. Erectile dysfunction due to arterial insufficiency         Plan:       Type 2 diabetes mellitus without complication, without long-term current use of insulin  Comments:  Going to switch to metformin XR for tolerability purposes.  increase dose to 750.  Orders:  -     metFORMIN (GLUCOPHAGE-XR) 750 MG XR 24hr tablet; Take 1 tablet (750 mg total) by mouth 2 (two) times daily with meals.  Dispense: 180 tablet; Refill: 1  A1c has improved to 7.2.  Not quite at goal but improving nicely.  Continue current metformin.  Increase exercise and reduce potatoes and bread  Need for vaccination  -     Pneumococcal Polysaccharide Vaccine (23 Valent) (SQ/IM)  In his diet.  Pneumovax today  Mixed hyperlipidemia  Comments:  refills as needed.  Orders:  -     rosuvastatin (CRESTOR) 10 MG tablet; Take 1 tablet (10 mg total) by mouth every other day.  Dispense: 45 tablet; Refill: 1  Cholesterol at goal but triglycerides still slightly elevated.  Continue rosuvastatin q.o.d.  Gastroesophageal reflux disease without esophagitis  Comments:  Doing well with omeprazole. continue as is.  Orders:  -     omeprazole (PRILOSEC) 20 MG capsule; Take 1 capsule (20 mg total) by mouth every other day.  Dispense: 45 capsule; Refill: 1  Doing well with omeprazole  Arthritis of right ankle  Okay for intermittent use of ibuprofen  Erectile dysfunction due to arterial insufficiency  Trial of sildenafil 20 mg 3-5 tablets p.r.n.    Follow up in about 3 months (around 6/4/2020) for Carlos-, Diabetic Check-Up.

## 2020-05-17 ENCOUNTER — PATIENT MESSAGE (OUTPATIENT)
Dept: FAMILY MEDICINE | Facility: CLINIC | Age: 65
End: 2020-05-17

## 2020-05-20 DIAGNOSIS — M19.071 ARTHRITIS OF RIGHT ANKLE: Primary | ICD-10-CM

## 2020-05-20 DIAGNOSIS — K21.9 GASTROESOPHAGEAL REFLUX DISEASE WITHOUT ESOPHAGITIS: ICD-10-CM

## 2020-05-20 DIAGNOSIS — E11.9 TYPE 2 DIABETES MELLITUS WITHOUT COMPLICATION, WITHOUT LONG-TERM CURRENT USE OF INSULIN: ICD-10-CM

## 2020-05-20 DIAGNOSIS — E78.2 MIXED HYPERLIPIDEMIA: ICD-10-CM

## 2020-05-20 RX ORDER — METFORMIN HYDROCHLORIDE 750 MG/1
750 TABLET, EXTENDED RELEASE ORAL 2 TIMES DAILY WITH MEALS
Qty: 180 TABLET | Refills: 1 | Status: SHIPPED | OUTPATIENT
Start: 2020-05-20 | End: 2020-06-15 | Stop reason: SDUPTHER

## 2020-05-20 RX ORDER — INDOMETHACIN 50 MG/1
50 CAPSULE ORAL 2 TIMES DAILY
Qty: 180 CAPSULE | Refills: 1 | Status: SHIPPED | OUTPATIENT
Start: 2020-05-20 | End: 2021-06-22 | Stop reason: SDUPTHER

## 2020-05-20 RX ORDER — OMEPRAZOLE 20 MG/1
20 CAPSULE, DELAYED RELEASE ORAL EVERY OTHER DAY
Qty: 45 CAPSULE | Refills: 1 | Status: SHIPPED | OUTPATIENT
Start: 2020-05-20 | End: 2020-06-15 | Stop reason: SDUPTHER

## 2020-05-20 RX ORDER — ROSUVASTATIN CALCIUM 10 MG/1
10 TABLET, COATED ORAL EVERY OTHER DAY
Qty: 45 TABLET | Refills: 1 | Status: SHIPPED | OUTPATIENT
Start: 2020-05-20 | End: 2020-06-15 | Stop reason: SDUPTHER

## 2020-05-20 NOTE — TELEPHONE ENCOUNTER
----- Message from Loree Chavez sent at 5/20/2020 12:37 PM CDT -----  Contact: Jacob Lou  Pt is calling to let the nurse know to send new scripts on [facility] medications [facility].   Pt# [phone]

## 2020-05-20 NOTE — TELEPHONE ENCOUNTER
----- Message from Thompson Patel sent at 5/20/2020 11:14 AM CDT -----  [facility] is needing [facility]   Pt [phone]

## 2020-06-15 ENCOUNTER — OFFICE VISIT (OUTPATIENT)
Dept: FAMILY MEDICINE | Facility: CLINIC | Age: 65
End: 2020-06-15
Payer: MEDICARE

## 2020-06-15 VITALS
HEART RATE: 78 BPM | SYSTOLIC BLOOD PRESSURE: 138 MMHG | TEMPERATURE: 98 F | DIASTOLIC BLOOD PRESSURE: 82 MMHG | HEIGHT: 72 IN | BODY MASS INDEX: 31.97 KG/M2 | WEIGHT: 236 LBS

## 2020-06-15 DIAGNOSIS — E11.9 TYPE 2 DIABETES MELLITUS WITHOUT COMPLICATION, WITHOUT LONG-TERM CURRENT USE OF INSULIN: Primary | ICD-10-CM

## 2020-06-15 DIAGNOSIS — E11.9 TYPE 2 DIABETES MELLITUS WITHOUT COMPLICATION, WITHOUT LONG-TERM CURRENT USE OF INSULIN: ICD-10-CM

## 2020-06-15 DIAGNOSIS — E78.2 MIXED HYPERLIPIDEMIA: ICD-10-CM

## 2020-06-15 DIAGNOSIS — K21.9 GASTROESOPHAGEAL REFLUX DISEASE WITHOUT ESOPHAGITIS: ICD-10-CM

## 2020-06-15 LAB — HBA1C MFR BLD: 6.3 %

## 2020-06-15 PROCEDURE — 83036 HEMOGLOBIN GLYCOSYLATED A1C: CPT | Mod: QW,,, | Performed by: PHYSICIAN ASSISTANT

## 2020-06-15 PROCEDURE — 83036 POCT HEMOGLOBIN A1C: ICD-10-PCS | Mod: QW,,, | Performed by: PHYSICIAN ASSISTANT

## 2020-06-15 PROCEDURE — 99214 OFFICE O/P EST MOD 30 MIN: CPT | Mod: S$GLB,,, | Performed by: PHYSICIAN ASSISTANT

## 2020-06-15 PROCEDURE — 99214 PR OFFICE/OUTPT VISIT, EST, LEVL IV, 30-39 MIN: ICD-10-PCS | Mod: S$GLB,,, | Performed by: PHYSICIAN ASSISTANT

## 2020-06-15 RX ORDER — METFORMIN HYDROCHLORIDE 750 MG/1
750 TABLET, EXTENDED RELEASE ORAL 2 TIMES DAILY WITH MEALS
Qty: 180 TABLET | Refills: 1 | Status: SHIPPED | OUTPATIENT
Start: 2020-06-15 | End: 2020-09-08 | Stop reason: SDUPTHER

## 2020-06-15 RX ORDER — ROSUVASTATIN CALCIUM 10 MG/1
10 TABLET, COATED ORAL EVERY OTHER DAY
Qty: 45 TABLET | Refills: 1 | Status: SHIPPED | OUTPATIENT
Start: 2020-06-15 | End: 2020-09-08 | Stop reason: SDUPTHER

## 2020-06-15 RX ORDER — OMEPRAZOLE 20 MG/1
20 CAPSULE, DELAYED RELEASE ORAL EVERY OTHER DAY
Qty: 45 CAPSULE | Refills: 1 | Status: SHIPPED | OUTPATIENT
Start: 2020-06-15 | End: 2020-09-08 | Stop reason: SDUPTHER

## 2020-06-15 NOTE — PROGRESS NOTES
SUBJECTIVE:    Patient ID: Jacob Lou is a 64 y.o. male.    Chief Complaint: Follow-up (3 month//brought bottles tb )    64-year-old white male presents today for regular 3 month diabetic check up.  A1c today at 6.3% and very well controlled. This is down from 7.2% at his last visit with Dr. Abel.      Patient Message on 02/25/2020   Component Date Value Ref Range Status    WBC 03/02/2020 5.7  3.8 - 10.8 Thousand/uL Final    RBC 03/02/2020 5.32  4.20 - 5.80 Million/uL Final    Hemoglobin 03/02/2020 16.2  13.2 - 17.1 g/dL Final    Hematocrit 03/02/2020 47.8  38.5 - 50.0 % Final    Mean Corpuscular Volume 03/02/2020 89.8  80.0 - 100.0 fL Final    Mean Corpuscular Hemoglobin 03/02/2020 30.5  27.0 - 33.0 pg Final    Mean Corpuscular Hemoglobin Conc 03/02/2020 33.9  32.0 - 36.0 g/dL Final    RDW 03/02/2020 13.1  11.0 - 15.0 % Final    Platelets 03/02/2020 225  140 - 400 Thousand/uL Final    MPV 03/02/2020 9.8  7.5 - 12.5 fL Final    Neutrophils Absolute 03/02/2020 3,067  1,500 - 7,800 cells/uL Final    Lymph # 03/02/2020 1,778  850 - 3,900 cells/uL Final    Mono # 03/02/2020 656  200 - 950 cells/uL Final    Eos # 03/02/2020 160  15 - 500 cells/uL Final    Baso # 03/02/2020 40  0 - 200 cells/uL Final    Neutrophils Relative 03/02/2020 53.8  % Final    Lymph% 03/02/2020 31.2  % Final    Mono% 03/02/2020 11.5  % Final    Eosinophil% 03/02/2020 2.8  % Final    Basophil% 03/02/2020 0.7  % Final    Glucose 03/02/2020 171* 65 - 99 mg/dL Final    BUN, Bld 03/02/2020 12  7 - 25 mg/dL Final    Creatinine 03/02/2020 1.08  0.70 - 1.25 mg/dL Final    eGFR if non African American 03/02/2020 71  > OR = 60 mL/min/1.73m2 Final    eGFR if African American 03/02/2020 82  > OR = 60 mL/min/1.73m2 Final    BUN/Creatinine Ratio 03/02/2020 NOT APPLICABLE  6 - 22 (calc) Final    Sodium 03/02/2020 138  135 - 146 mmol/L Final    Potassium 03/02/2020 4.7  3.5 - 5.3 mmol/L Final    Chloride 03/02/2020 100  98 -  110 mmol/L Final    CO2 03/02/2020 28  20 - 32 mmol/L Final    Calcium 03/02/2020 9.8  8.6 - 10.3 mg/dL Final    Total Protein 03/02/2020 7.6  6.1 - 8.1 g/dL Final    Albumin 03/02/2020 4.5  3.6 - 5.1 g/dL Final    Globulin, Total 03/02/2020 3.1  1.9 - 3.7 g/dL (calc) Final    Albumin/Globulin Ratio 03/02/2020 1.5  1.0 - 2.5 (calc) Final    Total Bilirubin 03/02/2020 0.7  0.2 - 1.2 mg/dL Final    Alkaline Phosphatase 03/02/2020 51  35 - 144 U/L Final    AST 03/02/2020 26  10 - 35 U/L Final    ALT 03/02/2020 40  9 - 46 U/L Final    Cholesterol 03/02/2020 185  <200 mg/dL Final    HDL 03/02/2020 41  > OR = 40 mg/dL Final    Triglycerides 03/02/2020 224* <150 mg/dL Final    LDL Cholesterol 03/02/2020 110* mg/dL (calc) Final    Hdl/Cholesterol Ratio 03/02/2020 4.5  <5.0 (calc) Final    Non HDL Chol. (LDL+VLDL) 03/02/2020 144* <130 mg/dL (calc) Final    Creatinine, Random Ur 03/02/2020 156  20 - 320 mg/dL Final    Microalb, Ur 03/02/2020 0.7  See Note: mg/dL Final    Microalb Creat Ratio 03/02/2020 4  <30 mcg/mg creat Final    TSH 03/02/2020 1.69  0.40 - 4.50 mIU/L Final    Hemoglobin A1C 03/02/2020 7.2* <5.7 % of total Hgb Final    Color, UA 03/02/2020 YELLOW  YELLOW Final    Appearance, UA 03/02/2020 CLEAR  CLEAR Final    Specific Gravity, UA 03/02/2020 1.020  1.001 - 1.035 Final    pH, UA 03/02/2020 6.5  5.0 - 8.0 Final    Glucose, UA 03/02/2020 NEGATIVE  NEGATIVE Final    Bilirubin, UA 03/02/2020 NEGATIVE  NEGATIVE Final    Ketones, UA 03/02/2020 NEGATIVE  NEGATIVE Final    Occult Blood UA 03/02/2020 NEGATIVE  NEGATIVE Final    Protein, UA 03/02/2020 NEGATIVE  NEGATIVE Final    Nitrite, UA 03/02/2020 NEGATIVE  NEGATIVE Final    Leukocytes, UA 03/02/2020 TRACE* NEGATIVE Final    WBC Casts, UA 03/02/2020 0-5  < OR = 5 /HPF Final    RBC Casts, UA 03/02/2020 NONE SEEN  < OR = 2 /HPF Final    Squam Epithel, UA 03/02/2020 NONE SEEN  < OR = 5 /HPF Final    Bacteria, UA 03/02/2020 NONE SEEN   NONE SEEN /HPF Final    Hyaline Casts, UA 03/02/2020 NONE SEEN  NONE SEEN /LPF Final    Reflexive Urine Culture 03/02/2020 CULTURE INDICATED - RESULTS TO FOLLOW   Final    Urine Culture, Routine 03/02/2020    Final       Past Medical History:   Diagnosis Date    GERD (gastroesophageal reflux disease)     Hyperlipidemia      Past Surgical History:   Procedure Laterality Date    cataracts  2019    Dr MARYANN Martinez    COLONOSCOPY N/A 11/30/2017    Procedure: COLONOSCOPY;  Surgeon: Maikel Medina MD;  [facility];  Service: Endoscopy;  Laterality: N/A;    FOOT SURGERY      HERNIA REPAIR       Family History   Problem Relation Age of Onset    Diabetes Mother     Hypertension Father        Marital Status:   Alcohol History:  reports current alcohol use of about 1.0 standard drinks of alcohol per week.  Tobacco History:  reports that he has never smoked. He has never used smokeless tobacco.  Drug History:  reports no history of drug use.    Review of patient's allergies indicates:  No Known Allergies    Current Outpatient Medications:     ibuprofen (ADVIL,MOTRIN) 200 MG tablet, Take 200 mg by mouth every 8 (eight) hours as needed for Pain., Disp: , Rfl:     indomethacin (INDOCIN) 50 MG capsule, Take 1 capsule (50 mg total) by mouth 2 (two) times daily., Disp: 180 capsule, Rfl: 1    metFORMIN (GLUCOPHAGE-XR) 750 MG XR 24hr tablet, Take 1 tablet (750 mg total) by mouth 2 (two) times daily with meals., Disp: 180 tablet, Rfl: 1    omeprazole (PRILOSEC) 20 MG capsule, Take 1 capsule (20 mg total) by mouth every other day., Disp: 45 capsule, Rfl: 1    rosuvastatin (CRESTOR) 10 MG tablet, Take 1 tablet (10 mg total) by mouth every other day., Disp: 45 tablet, Rfl: 1    sildenafil (REVATIO) 20 mg Tab, Take 2-5 tablets p.o. p.r.n. sex, Disp: 30 tablet, Rfl: 5    Review of Systems   Constitutional: Negative for activity change, fatigue, fever and unexpected weight change.   HENT: Negative for congestion.     Respiratory: Negative for apnea, cough, chest tightness and shortness of breath.    Cardiovascular: Negative for chest pain and palpitations.   Gastrointestinal: Negative for abdominal distention and abdominal pain.   Genitourinary: Negative for difficulty urinating and dysuria.   Musculoskeletal: Negative for arthralgias and back pain.   Neurological: Negative for dizziness and weakness.          Objective:      Vitals:    06/15/20 0818   BP: 138/82   Pulse: 78   Temp: 97.5 °F (36.4 °C)   Weight: 107 kg (236 lb)   Height: 6' (1.829 m)     Physical Exam  Constitutional:       General: He is not in acute distress.     Appearance: He is well-developed.   HENT:      Head: Normocephalic and atraumatic.   Eyes:      Pupils: Pupils are equal, round, and reactive to light.   Neck:      Musculoskeletal: Normal range of motion and neck supple.      Thyroid: No thyromegaly.   Cardiovascular:      Rate and Rhythm: Normal rate and regular rhythm.      Heart sounds: Normal heart sounds.   Pulmonary:      Effort: Pulmonary effort is normal.      Breath sounds: Normal breath sounds.   Abdominal:      General: Bowel sounds are normal. There is no distension.      Palpations: Abdomen is soft.      Tenderness: There is no abdominal tenderness.   Musculoskeletal: Normal range of motion.   Skin:     General: Skin is warm and dry.      Findings: No erythema or rash.   Neurological:      Mental Status: He is alert and oriented to person, place, and time.      Cranial Nerves: No cranial nerve deficit.           Assessment:       1. Type 2 diabetes mellitus without complication, without long-term current use of insulin    2. Type 2 diabetes mellitus without complication, without long-term current use of insulin    3. Gastroesophageal reflux disease without esophagitis    4. Mixed hyperlipidemia         Plan:       Type 2 diabetes mellitus without complication, without long-term current use of insulin  Comments:  Greatly improved to 6.3%  today. NO changes  Orders:  -     Hemoglobin A1C, POCT  -     metFORMIN (GLUCOPHAGE-XR) 750 MG XR 24hr tablet; Take 1 tablet (750 mg total) by mouth 2 (two) times daily with meals.  Dispense: 180 tablet; Refill: 1    Type 2 diabetes mellitus without complication, without long-term current use of insulin  -     Hemoglobin A1C, POCT  -     metFORMIN (GLUCOPHAGE-XR) 750 MG XR 24hr tablet; Take 1 tablet (750 mg total) by mouth 2 (two) times daily with meals.  Dispense: 180 tablet; Refill: 1    Gastroesophageal reflux disease without esophagitis  Comments:  Doing well with omeprazole. continue as is.  Orders:  -     omeprazole (PRILOSEC) 20 MG capsule; Take 1 capsule (20 mg total) by mouth every other day.  Dispense: 45 capsule; Refill: 1    Mixed hyperlipidemia  Comments:  refills as needed.  Orders:  -     rosuvastatin (CRESTOR) 10 MG tablet; Take 1 tablet (10 mg total) by mouth every other day.  Dispense: 45 tablet; Refill: 1      No follow-ups on file.        6/15/2020 Leonard Stein PA-C

## 2020-06-15 NOTE — PATIENT INSTRUCTIONS
Diet: Diabetes  Food is an important tool that you can use to control diabetes and stay healthy. Eating well-balanced meals in the correct amounts will help you control your blood glucose levels and prevent low blood sugar reactions. It will also help you reduce the health risks of diabetes. There is no one specific diet that is right for everyone with diabetes. But there are general guidelines to follow. A registered dietitian (RD) will create a tailored diet approach thats just right for you. He or she will also help you plan healthy meals and snacks. If you have any questions, call your dietitian for advice.     Guidelines for success  Talk with your healthcare provider before starting a diabetes diet or weight loss program. If you haven't talked with a dietitian yet, ask your provider for a referral. The following guidelines can help you succeed:  · Select foods from the 6 food groups below. Your dietitian will help you find food choices within each group. He or she will also show you serving sizes and how many servings you can have at each meal.  ¨ Grains, beans, and starchy vegetables  ¨ Vegetables  ¨ Fruit  ¨ Milk or yogurt  ¨ Meat, poultry, fish, or tofu  ¨ Healthy fats  · Check your blood sugar levels as directed by your provider. Take any medicine as prescribed by your provider.  · Learn to read food labels and pick the right portion sizes.  · Eat only the amount of food in your meal plan. Eat about the same amount of food at regular times each day. Dont skip meals. Eat meals 4 to 5 hours apart, with snacks in between.  · Limit alcohol. It raises blood sugar levels. Drink water or calorie-free diet drinks that use safe sweeteners.  · Eat less fat to help lower your risk of heart disease. Use nonfat or low-fat dairy products and lean meats. Avoid fried foods. Use cooking oils that are unsaturated, such as olive, canola, or peanut oil.  · Talk with your dietitian about safe sugar substitutes.  · Avoid  added salt. It can contribute to high blood pressure, which can cause heart disease. People with diabetes already have a risk of high blood pressure and heart disease.  · Stay at a healthy weight. If you need to lose weight, cut down on your portion sizes. But dont skip meals. Exercise is an important part of any weight management program. Talk with your provider about an exercise program thats right for you.  · For more information about the best diet plan for you, talk with a registered dietitian (RD). To find an RD in your area, contact:  ¨ Academy of Nutrition and Dietetics www.eatright.org  ¨ The American Diabetes Association 094-568-3557 www.diabetes.org  Date Last Reviewed: 8/1/2016 © 2000-2017 [employer]. [address] [place] [address], [place] [address]. All rights reserved. This information is not intended as a substitute for professional medical care. Always follow your healthcare professional's instructions.

## 2020-09-08 ENCOUNTER — OFFICE VISIT (OUTPATIENT)
Dept: FAMILY MEDICINE | Facility: CLINIC | Age: 65
End: 2020-09-08
Payer: MEDICARE

## 2020-09-08 VITALS
HEIGHT: 72 IN | SYSTOLIC BLOOD PRESSURE: 146 MMHG | DIASTOLIC BLOOD PRESSURE: 96 MMHG | HEART RATE: 72 BPM | BODY MASS INDEX: 31.69 KG/M2 | WEIGHT: 234 LBS

## 2020-09-08 DIAGNOSIS — N52.01 ERECTILE DYSFUNCTION DUE TO ARTERIAL INSUFFICIENCY: ICD-10-CM

## 2020-09-08 DIAGNOSIS — Z82.49 FAMILY HISTORY OF CORONARY ARTERY DISEASE: ICD-10-CM

## 2020-09-08 DIAGNOSIS — Z23 NEED FOR IMMUNIZATION AGAINST INFLUENZA: ICD-10-CM

## 2020-09-08 DIAGNOSIS — K21.9 GASTROESOPHAGEAL REFLUX DISEASE WITHOUT ESOPHAGITIS: ICD-10-CM

## 2020-09-08 DIAGNOSIS — E11.9 TYPE 2 DIABETES MELLITUS WITHOUT COMPLICATION, WITHOUT LONG-TERM CURRENT USE OF INSULIN: Primary | ICD-10-CM

## 2020-09-08 DIAGNOSIS — E78.2 MIXED HYPERLIPIDEMIA: ICD-10-CM

## 2020-09-08 DIAGNOSIS — Z12.5 SPECIAL SCREENING FOR MALIGNANT NEOPLASM OF PROSTATE: ICD-10-CM

## 2020-09-08 LAB — HBA1C MFR BLD: 5.9 %

## 2020-09-08 PROCEDURE — 90662 FLU VACCINE - QUADRIVALENT - HIGH DOSE (65+) PRESERVATIVE FREE IM: ICD-10-PCS | Mod: S$GLB,,, | Performed by: FAMILY MEDICINE

## 2020-09-08 PROCEDURE — G0008 FLU VACCINE - QUADRIVALENT - HIGH DOSE (65+) PRESERVATIVE FREE IM: ICD-10-PCS | Mod: S$GLB,,, | Performed by: FAMILY MEDICINE

## 2020-09-08 PROCEDURE — G0008 ADMIN INFLUENZA VIRUS VAC: HCPCS | Mod: S$GLB,,, | Performed by: FAMILY MEDICINE

## 2020-09-08 PROCEDURE — 90662 IIV NO PRSV INCREASED AG IM: CPT | Mod: S$GLB,,, | Performed by: FAMILY MEDICINE

## 2020-09-08 PROCEDURE — 83036 HEMOGLOBIN GLYCOSYLATED A1C: CPT | Mod: QW,,, | Performed by: FAMILY MEDICINE

## 2020-09-08 PROCEDURE — 99214 PR OFFICE/OUTPT VISIT, EST, LEVL IV, 30-39 MIN: ICD-10-PCS | Mod: 25,S$GLB,, | Performed by: FAMILY MEDICINE

## 2020-09-08 PROCEDURE — 83036 POCT HEMOGLOBIN A1C: ICD-10-PCS | Mod: QW,,, | Performed by: FAMILY MEDICINE

## 2020-09-08 PROCEDURE — 99214 OFFICE O/P EST MOD 30 MIN: CPT | Mod: 25,S$GLB,, | Performed by: FAMILY MEDICINE

## 2020-09-08 RX ORDER — ROSUVASTATIN CALCIUM 10 MG/1
10 TABLET, COATED ORAL EVERY OTHER DAY
Qty: 45 TABLET | Refills: 1 | Status: SHIPPED | OUTPATIENT
Start: 2020-09-08 | End: 2021-01-19

## 2020-09-08 RX ORDER — OMEPRAZOLE 20 MG/1
20 CAPSULE, DELAYED RELEASE ORAL EVERY OTHER DAY
Qty: 45 CAPSULE | Refills: 1 | Status: SHIPPED | OUTPATIENT
Start: 2020-09-08 | End: 2021-01-19 | Stop reason: SDUPTHER

## 2020-09-08 RX ORDER — METFORMIN HYDROCHLORIDE 750 MG/1
750 TABLET, EXTENDED RELEASE ORAL 2 TIMES DAILY WITH MEALS
Qty: 180 TABLET | Refills: 1 | Status: SHIPPED | OUTPATIENT
Start: 2020-09-08 | End: 2021-01-19 | Stop reason: SDUPTHER

## 2020-09-08 NOTE — PROGRESS NOTES
SUBJECTIVE:    Patient ID: Jacob Lou is a 64 y.o. male.    Chief Complaint: Diabetes (brought bottles // Eye exam- Dr. Carlitos tapia)    This 64-year-old male has a positive family history of coronary artery disease.  Being a diabetic and hyperlipidemic, we sent him for cardiology referral to [facility]  [place].  He has undergone multiple tests including echo, nuclear stress testing, arterial ultrasounds.  His nuclear stress test was normal.  Arterial ultrasound showed mild plaquing.  Echo was unremarkable.  However CT calcium score of the heart was elevated to 1032.  This place him at high risk and high incidence of coronary calcifications throughout all vessels.Dr Hill recommended coronary angiogram.  This patient is very active and can split logs and do gardening.  He gets some fatigue after activities but never gets chest pain or shortness of breath.  No chest pressure.      Office Visit on 06/15/2020   Component Date Value Ref Range Status    Hemoglobin A1C 06/15/2020 6.3  % Final       Past Medical History:   Diagnosis Date    GERD (gastroesophageal reflux disease)     Hyperlipidemia      Social History     Socioeconomic History    Marital status:      Spouse name: Not on file    Number of children: Not on file    Years of education: Not on file    Highest education level: Not on file   Occupational History    Not on file   Social Needs    Financial resource strain: Not very hard    Food insecurity     Worry: Never true     Inability: Patient refused    Transportation needs     Medical: Not on file     Non-medical: Not on file   Tobacco Use    Smoking status: Never Smoker    Smokeless tobacco: Never Used   Substance and Sexual Activity    Alcohol use: Yes     Alcohol/week: 1.0 standard drinks     Types: 1 Glasses of wine per week    Drug use: No    Sexual activity: Yes     Partners: Female   Lifestyle    Physical activity     Days per week: 3 days     Minutes per session: 60  min    Stress: Not at all   Relationships    Social connections     Talks on phone: More than three times a week     Gets together: Three times a week     Attends [employer] service: Not on file     Active member of club or organization: Yes     Attends meetings of clubs or organizations: More than 4 times per year     Relationship status:    Other Topics Concern    Not on file   Social History Narrative    Not on file     Past Surgical History:   Procedure Laterality Date    cataracts  2019    Dr MARYANN Martinez    COLONOSCOPY N/A 11/30/2017    Procedure: COLONOSCOPY;  Surgeon: Maikel Medina MD;  Location: [facility];  Service: Endoscopy;  Laterality: N/A;    EYE SURGERY  April 2019    FOOT SURGERY      HERNIA REPAIR       Family History   Problem Relation Age of Onset    Diabetes Mother     Hypertension Father        Review of patient's allergies indicates:  No Known Allergies    Current Outpatient Medications:     metFORMIN (GLUCOPHAGE-XR) 750 MG ER 24hr tablet, Take 1 tablet (750 mg total) by mouth 2 (two) times daily with meals., Disp: 180 tablet, Rfl: 1    omeprazole (PRILOSEC) 20 MG capsule, Take 1 capsule (20 mg total) by mouth every other day., Disp: 45 capsule, Rfl: 1    rosuvastatin (CRESTOR) 10 MG tablet, Take 1 tablet (10 mg total) by mouth every other day., Disp: 45 tablet, Rfl: 1    ibuprofen (ADVIL,MOTRIN) 200 MG tablet, Take 200 mg by mouth every 8 (eight) hours as needed for Pain., Disp: , Rfl:     indomethacin (INDOCIN) 50 MG capsule, Take 1 capsule (50 mg total) by mouth 2 (two) times daily., Disp: 180 capsule, Rfl: 1    sildenafil (REVATIO) 20 mg Tab, Take 2-5 tablets p.o. p.r.n. sex, Disp: 30 tablet, Rfl: 5    Review of Systems   Constitutional: Negative for appetite change, chills, fatigue, fever and unexpected weight change.   HENT: Negative for congestion, ear pain and trouble swallowing.    Eyes: Negative for pain, discharge and visual disturbance.   Respiratory: Negative  for apnea, cough, shortness of breath and wheezing.    Cardiovascular: Negative for chest pain and leg swelling.   Gastrointestinal: Negative for abdominal pain, blood in stool, constipation, diarrhea, nausea and vomiting.   Endocrine: Negative for cold intolerance, heat intolerance and polydipsia.   Genitourinary: Negative for dysuria, hematuria, testicular pain and urgency.   Musculoskeletal: Negative for gait problem, joint swelling and myalgias.   Neurological: Negative for dizziness, seizures and numbness.   Psychiatric/Behavioral: Negative for agitation, behavioral problems and hallucinations. The patient is not nervous/anxious.           Objective:      Vitals:    09/08/20 0815 09/08/20 0816   BP: (!) 146/96 (!) 146/96   Pulse: 72    Weight: 106.1 kg (234 lb)    Height: 6' (1.829 m)      Physical Exam  Vitals signs and nursing note reviewed.   Constitutional:       Appearance: He is well-developed. He is obese.   HENT:      Head: Normocephalic and atraumatic.      Right Ear: External ear normal.      Left Ear: External ear normal.      Nose: Nose normal.   Eyes:      Pupils: Pupils are equal, round, and reactive to light.   Neck:      Musculoskeletal: Normal range of motion and neck supple.      Thyroid: No thyromegaly.      Vascular: No carotid bruit.   Cardiovascular:      Rate and Rhythm: Normal rate and regular rhythm.      Heart sounds: Normal heart sounds. No murmur.   Pulmonary:      Effort: Pulmonary effort is normal.      Breath sounds: Normal breath sounds. No wheezing or rales.   Abdominal:      General: Bowel sounds are normal. There is no distension.      Palpations: Abdomen is soft.      Tenderness: There is no abdominal tenderness.   Musculoskeletal: Normal range of motion.         General: No tenderness or deformity.      Lumbar back: Normal. He exhibits no pain and no spasm.      Comments: Bends 90 degrees at  waist shoulders and knees have full range of motion   Lymphadenopathy:       Cervical: No cervical adenopathy.   Skin:     General: Skin is warm and dry.      Findings: No rash.   Neurological:      Mental Status: He is alert and oriented to person, place, and time.      Cranial Nerves: No cranial nerve deficit.      Coordination: Coordination normal.   Psychiatric:         Behavior: Behavior normal.         Thought Content: Thought content normal.         Judgment: Judgment normal.           Assessment:       1. Type 2 diabetes mellitus without complication, without long-term current use of insulin    2. Need for immunization against influenza    3. Mixed hyperlipidemia    4. Gastroesophageal reflux disease without esophagitis    5. Special screening for malignant neoplasm of prostate    6. Erectile dysfunction due to arterial insufficiency    7. Family history of coronary artery disease         Plan:       Type 2 diabetes mellitus without complication, without long-term current use of insulin  Comments:  Greatly improved to 5.9 % today. NO changes  Orders:  -     metFORMIN (GLUCOPHAGE-XR) 750 MG ER 24hr tablet; Take 1 tablet (750 mg total) by mouth 2 (two) times daily with meals.  Dispense: 180 tablet; Refill: 1    Need for immunization against influenza  -     Influenza - Quadrivalent - High Dose (65+) (PF) (IM)  Flu shot today  Mixed hyperlipidemia  Comments:  refills as needed.  Orders:  -     rosuvastatin (CRESTOR) 10 MG tablet; Take 1 tablet (10 mg total) by mouth every other day.  Dispense: 45 tablet; Refill: 1  -     Lipid Panel; Future; Expected date: 09/08/2020  -     Comprehensive metabolic panel; Future; Expected date: 09/08/2020  Order lipid panel for today  Gastroesophageal reflux disease without esophagitis  Comments:  Doing well with omeprazole. continue as is.  Orders:  -     omeprazole (PRILOSEC) 20 MG capsule; Take 1 capsule (20 mg total) by mouth every other day.  Dispense: 45 capsule; Refill: 1    Special screening for malignant neoplasm of prostate  -     PSA,  Screening; Future; Expected date: 09/08/2020  PSA today  Erectile dysfunction due to arterial insufficiency    Family history of coronary artery disease  I advised patient to go ahead and get his coronary angiogram done.  There is a chance he could have passed a stress test and still have multivessel disease.  He agrees to set this up in September with .    No follow-ups on file.        9/8/2020 Tyler Abel

## 2020-09-08 NOTE — LETTER
[address] [facility] [address]  [place] [address]  Phone: [phone]   [phone]                        MD Makeda Rincon MD Chequita Williams, MD Matthew Bassett, SNEHAL Moreno, TENZIN Galeana, TENZIN      Date: 09/08/2020        Patient: Jacob Lou  YOB: 1955      Please fax a copy of pt's recent eye exam.       Sincerely,     Mariela Jimenez MA

## 2020-09-09 LAB
ALBUMIN SERPL-MCNC: 4.6 G/DL (ref 3.6–5.1)
ALBUMIN/GLOB SERPL: 1.5 (CALC) (ref 1–2.5)
ALP SERPL-CCNC: 46 U/L (ref 35–144)
ALT SERPL-CCNC: 36 U/L (ref 9–46)
AST SERPL-CCNC: 25 U/L (ref 10–35)
BILIRUB SERPL-MCNC: 0.7 MG/DL (ref 0.2–1.2)
BUN SERPL-MCNC: 11 MG/DL (ref 7–25)
BUN/CREAT SERPL: ABNORMAL (CALC) (ref 6–22)
CALCIUM SERPL-MCNC: 9.3 MG/DL (ref 8.6–10.3)
CHLORIDE SERPL-SCNC: 100 MMOL/L (ref 98–110)
CHOLEST SERPL-MCNC: 170 MG/DL
CHOLEST/HDLC SERPL: 3.6 (CALC)
CO2 SERPL-SCNC: 30 MMOL/L (ref 20–32)
CREAT SERPL-MCNC: 0.93 MG/DL (ref 0.7–1.25)
GFRSERPLBLD MDRD-ARVRAT: 85 ML/MIN/1.73M2
GLOBULIN SER CALC-MCNC: 3.1 G/DL (CALC) (ref 1.9–3.7)
GLUCOSE SERPL-MCNC: 136 MG/DL (ref 65–99)
HDLC SERPL-MCNC: 47 MG/DL
LDLC SERPL CALC-MCNC: 93 MG/DL (CALC)
NONHDLC SERPL-MCNC: 123 MG/DL (CALC)
POTASSIUM SERPL-SCNC: 4.2 MMOL/L (ref 3.5–5.3)
PROT SERPL-MCNC: 7.7 G/DL (ref 6.1–8.1)
PSA SERPL-MCNC: 0.9 NG/ML
SODIUM SERPL-SCNC: 139 MMOL/L (ref 135–146)
TRIGL SERPL-MCNC: 199 MG/DL

## 2020-09-14 ENCOUNTER — TELEPHONE (OUTPATIENT)
Dept: FAMILY MEDICINE | Facility: CLINIC | Age: 65
End: 2020-09-14

## 2020-09-14 NOTE — TELEPHONE ENCOUNTER
----- Message from Tyler Abel MD sent at 9/13/2020  8:07 PM CDT -----  Call pt . Chol has decreased to 170 TG's 199.Glucose  136 , kidneys and liver look normal.PSA normal at 0.9, cont same meds

## 2020-09-28 ENCOUNTER — PATIENT MESSAGE (OUTPATIENT)
Dept: FAMILY MEDICINE | Facility: CLINIC | Age: 65
End: 2020-09-28

## 2020-10-30 ENCOUNTER — PATIENT MESSAGE (OUTPATIENT)
Dept: FAMILY MEDICINE | Facility: CLINIC | Age: 65
End: 2020-10-30

## 2020-11-04 ENCOUNTER — PATIENT MESSAGE (OUTPATIENT)
Dept: FAMILY MEDICINE | Facility: CLINIC | Age: 65
End: 2020-11-04

## 2020-11-05 ENCOUNTER — TELEPHONE (OUTPATIENT)
Dept: FAMILY MEDICINE | Facility: CLINIC | Age: 65
End: 2020-11-05

## 2020-11-05 NOTE — TELEPHONE ENCOUNTER
----- Message from Loree Chavez sent at 11/5/2020 12:17 PM CST -----  Regarding: Needs call back from nurse  Contact: Jacob worrell/ Dr. Hill office. The pt had Open heart Sx on Monday and that he's doing well. He should be returning back home tomorrow . Please give Madai a call back # [phone]

## 2020-11-05 NOTE — TELEPHONE ENCOUNTER
Spoke with Madai the pt is currently at [facility] with plans on being d/c tomorrow. He had a CABG x 3. Will hold until tomorrow to f/up with pt.

## 2020-11-06 ENCOUNTER — PATIENT OUTREACH (OUTPATIENT)
Dept: FAMILY MEDICINE | Facility: CLINIC | Age: 65
End: 2020-11-06

## 2020-11-06 NOTE — PROGRESS NOTES
Discharge Information     Discharge Date:   11/06/20    Primary Discharge Diagnosis:  CABG x3      Discharge Summary:  Requested      Medication & Order Review     Were medication changes made or new medications added?   Yes    If so, has the patient filled the prescriptions?  Yes     Was Home Health ordered? Yes    If so, has Home Health contacted patient and/or initiated services?  Yes    Name of Home Health Agency? pt not sure of the agency but will let us know at his appt     Durable Medical Equipment ordered?  No     If so, has the DME provider contacted patient and delivered equipment?  N/A    Follow Up               Any problems since discharge? No    How is the patient feeling since returning home?  Pt states he feels great. Just walked outside    Have you set up recommended follow up appointments?  Several appt scheduled next week.     Schedule Hospital Follow-up appointment within 7 days    Notes:              Re Vaughan

## 2020-11-10 ENCOUNTER — OFFICE VISIT (OUTPATIENT)
Dept: FAMILY MEDICINE | Facility: CLINIC | Age: 65
End: 2020-11-10
Payer: MEDICARE

## 2020-11-10 VITALS
WEIGHT: 225 LBS | HEART RATE: 76 BPM | TEMPERATURE: 97 F | SYSTOLIC BLOOD PRESSURE: 112 MMHG | HEIGHT: 72 IN | BODY MASS INDEX: 30.48 KG/M2 | DIASTOLIC BLOOD PRESSURE: 72 MMHG

## 2020-11-10 DIAGNOSIS — E11.9 TYPE 2 DIABETES MELLITUS WITHOUT COMPLICATION, WITHOUT LONG-TERM CURRENT USE OF INSULIN: ICD-10-CM

## 2020-11-10 DIAGNOSIS — Z82.49 FAMILY HISTORY OF CORONARY ARTERY DISEASE: ICD-10-CM

## 2020-11-10 DIAGNOSIS — K21.9 GASTROESOPHAGEAL REFLUX DISEASE WITHOUT ESOPHAGITIS: ICD-10-CM

## 2020-11-10 DIAGNOSIS — Z95.1 STATUS POST THREE VESSEL CORONARY ARTERY BYPASS: Primary | ICD-10-CM

## 2020-11-10 DIAGNOSIS — E78.2 MIXED HYPERLIPIDEMIA: ICD-10-CM

## 2020-11-10 PROCEDURE — 99495 TRANSJ CARE MGMT MOD F2F 14D: CPT | Mod: S$GLB,,, | Performed by: FAMILY MEDICINE

## 2020-11-10 PROCEDURE — 99495 TCM SERVICES (MODERATE COMPLEXITY): ICD-10-PCS | Mod: S$GLB,,, | Performed by: FAMILY MEDICINE

## 2020-11-10 RX ORDER — INSULIN PUMP SYRINGE, 3 ML
EACH MISCELLANEOUS
Qty: 1 EACH | Refills: 0 | Status: SHIPPED | OUTPATIENT
Start: 2020-11-10 | End: 2020-11-10 | Stop reason: SDUPTHER

## 2020-11-10 RX ORDER — LANCETS
EACH MISCELLANEOUS
Qty: 100 EACH | Refills: 1 | Status: SHIPPED | OUTPATIENT
Start: 2020-11-10

## 2020-11-10 RX ORDER — INSULIN PUMP SYRINGE, 3 ML
EACH MISCELLANEOUS
Qty: 1 EACH | Refills: 0 | Status: SHIPPED | OUTPATIENT
Start: 2020-11-10 | End: 2021-11-10

## 2020-11-10 RX ORDER — LANCETS
EACH MISCELLANEOUS
Qty: 100 EACH | Refills: 1 | Status: SHIPPED | OUTPATIENT
Start: 2020-11-10 | End: 2020-11-10 | Stop reason: SDUPTHER

## 2020-11-10 RX ORDER — METOPROLOL TARTRATE 25 MG/1
25 TABLET, FILM COATED ORAL 2 TIMES DAILY
COMMUNITY
Start: 2020-10-30 | End: 2021-01-19 | Stop reason: SDUPTHER

## 2020-11-10 RX ORDER — AMIODARONE HYDROCHLORIDE 200 MG/1
200 TABLET ORAL 2 TIMES DAILY
COMMUNITY
End: 2021-01-19

## 2020-11-10 RX ORDER — LOSARTAN POTASSIUM 50 MG/1
50 TABLET ORAL DAILY
COMMUNITY
End: 2021-01-19 | Stop reason: SDUPTHER

## 2020-11-10 NOTE — TELEPHONE ENCOUNTER
----- Message from Thompson Patel sent at 11/10/2020  4:19 PM CST -----  Regarding: needing call back  Pt was here today and a rx was sent to [facility] for a glucose meter pt is needing the rx to send [facility] in [place]   Pt [phone]

## 2020-11-11 PROBLEM — Z95.1 STATUS POST THREE VESSEL CORONARY ARTERY BYPASS: Status: ACTIVE | Noted: 2020-11-11

## 2020-11-12 ENCOUNTER — TELEPHONE (OUTPATIENT)
Dept: FAMILY MEDICINE | Facility: CLINIC | Age: 65
End: 2020-11-12

## 2020-11-12 NOTE — PROGRESS NOTES
SUBJECTIVE:    Patient ID: Jacob Lou is a 64 y.o. male.    Chief Complaint: Hospital Follow Up (did not bring bottles // Eye exam- Dr. Justin tapia )    This 64-year-old male is now 1 week post 3 vessel CABG with WAGNER per Dr. Mims.  Patient was very active prior to this and had 1 2 episodes of minimal chest pain.  He then had a cardiac workup with .  He passed a stress test but then at a high calcium score on CTcardiac.  Angiogram was then performed and he was found to have 100% stenosis of his LAD 70% stenosis of the circumflex and RCA a 50-60% stenosis.  He then underwent 3 vessel CABG with WAGNER.  He is now doing well on aspirin 325 daily amiodarone 200 mg b.i.d. to prevent AFib.    [facility] has [facility] coming out do wound care vital signs.  He is walking getting around very well.  Here initially plans to go to cardiac rehab 6 weeks postop.    He would like a glucometer to help monitor blood sugars.      Office Visit on 09/08/2020   Component Date Value Ref Range Status    Cholesterol 09/08/2020 170  <200 mg/dL Final    HDL 09/08/2020 47  > OR = 40 mg/dL Final    Triglycerides 09/08/2020 199* <150 mg/dL Final    LDL Cholesterol 09/08/2020 93  mg/dL (calc) Final    HDL/Cholesterol Ratio 09/08/2020 3.6  <5.0 (calc) Final    Non HDL Chol. (LDL+VLDL) 09/08/2020 123  <130 mg/dL (calc) Final    Glucose 09/08/2020 136* 65 - 99 mg/dL Final    BUN 09/08/2020 11  7 - 25 mg/dL Final    Creatinine 09/08/2020 0.93  0.70 - 1.25 mg/dL Final    eGFR if non African American 09/08/2020 85  > OR = 60 mL/min/1.73m2 Final    eGFR if African American 09/08/2020 99  > OR = 60 mL/min/1.73m2 Final    BUN/Creatinine Ratio 09/08/2020 NOT APPLICABLE  6 - 22 (calc) Final    Sodium 09/08/2020 139  135 - 146 mmol/L Final    Potassium 09/08/2020 4.2  3.5 - 5.3 mmol/L Final    Chloride 09/08/2020 100  98 - 110 mmol/L Final    CO2 09/08/2020 30  20 - 32 mmol/L Final    Calcium 09/08/2020 9.3  8.6 - 10.3 mg/dL  Final    Total Protein 09/08/2020 7.7  6.1 - 8.1 g/dL Final    Albumin 09/08/2020 4.6  3.6 - 5.1 g/dL Final    Globulin, Total 09/08/2020 3.1  1.9 - 3.7 g/dL (calc) Final    Albumin/Globulin Ratio 09/08/2020 1.5  1.0 - 2.5 (calc) Final    Total Bilirubin 09/08/2020 0.7  0.2 - 1.2 mg/dL Final    Alkaline Phosphatase 09/08/2020 46  35 - 144 U/L Final    AST 09/08/2020 25  10 - 35 U/L Final    ALT 09/08/2020 36  9 - 46 U/L Final    PROSTATE SPECIFIC ANTIGEN, SCR - Q* 09/08/2020 0.9  < OR = 4.0 ng/mL Final    Hemoglobin A1C 09/08/2020 5.9  % Final   Office Visit on 06/15/2020   Component Date Value Ref Range Status    Hemoglobin A1C 06/15/2020 6.3  % Final       Past Medical History:   Diagnosis Date    GERD (gastroesophageal reflux disease)     Hyperlipidemia      Social History     Socioeconomic History    Marital status:      Spouse name: Not on file    Number of children: Not on file    Years of education: Not on file    Highest education level: Not on file   Occupational History    Not on file   Social Needs    Financial resource strain: Not very hard    Food insecurity     Worry: Never true     Inability: Patient refused    Transportation needs     Medical: Not on file     Non-medical: Not on file   Tobacco Use    Smoking status: Never Smoker    Smokeless tobacco: Never Used   Substance and Sexual Activity    Alcohol use: Yes     Alcohol/week: 1.0 standard drinks     Types: 1 Glasses of wine per week    Drug use: No    Sexual activity: Yes     Partners: Female   Lifestyle    Physical activity     Days per week: 3 days     Minutes per session: 60 min    Stress: Not at all   Relationships    Social connections     Talks on phone: More than three times a week     Gets together: Three times a week     Attends Baptism service: Not on file     Active member of club or organization: Yes     Attends meetings of clubs or organizations: More than 4 times per year     Relationship  status:    Other Topics Concern    Not on file   Social History Narrative    Not on file     Past Surgical History:   Procedure Laterality Date    cataracts  2019    Dr MARYANN Martinez    COLONOSCOPY N/A 11/30/2017    Procedure: COLONOSCOPY;  Surgeon: Maikel Medina MD;  Location: [facility];  Service: Endoscopy;  Laterality: N/A;    EYE SURGERY  April 2019    FOOT SURGERY      HERNIA REPAIR       Family History   Problem Relation Age of Onset    Diabetes Mother     Hypertension Father        Review of patient's allergies indicates:  No Known Allergies    Current Outpatient Medications:     amiodarone (PACERONE) 200 MG Tab, Take 200 mg by mouth 2 (two) times daily., Disp: , Rfl:     losartan (COZAAR) 50 MG tablet, Take 50 mg by mouth once daily., Disp: , Rfl:     metoprolol tartrate (LOPRESSOR) 25 MG tablet, Take 25 mg by mouth 2 (two) times daily. , Disp: , Rfl:     blood sugar diagnostic Strp, To check BG 1 times daily, to use with insurance preferred meter, Disp: 100 strip, Rfl: 0    blood-glucose meter kit, To check BG 1 times daily, to use with insurance preferred meter, Disp: 1 each, Rfl: 0    ibuprofen (ADVIL,MOTRIN) 200 MG tablet, Take 200 mg by mouth every 8 (eight) hours as needed for Pain., Disp: , Rfl:     indomethacin (INDOCIN) 50 MG capsule, Take 1 capsule (50 mg total) by mouth 2 (two) times daily., Disp: 180 capsule, Rfl: 1    lancets Misc, To check BG 1 times daily, to use with insurance preferred meter, Disp: 100 each, Rfl: 1    metFORMIN (GLUCOPHAGE-XR) 750 MG ER 24hr tablet, Take 1 tablet (750 mg total) by mouth 2 (two) times daily with meals., Disp: 180 tablet, Rfl: 1    omeprazole (PRILOSEC) 20 MG capsule, Take 1 capsule (20 mg total) by mouth every other day., Disp: 45 capsule, Rfl: 1    rosuvastatin (CRESTOR) 10 MG tablet, Take 1 tablet (10 mg total) by mouth every other day., Disp: 45 tablet, Rfl: 1    sildenafil (REVATIO) 20 mg Tab, Take 2-5 tablets p.o. p.r.n. sex,  Disp: 30 tablet, Rfl: 5    Review of Systems   Constitutional: Negative for appetite change, chills, fatigue, fever and unexpected weight change.   HENT: Negative for congestion, ear pain and trouble swallowing.    Eyes: Negative for pain, discharge and visual disturbance.   Respiratory: Negative for apnea, cough, shortness of breath and wheezing.    Cardiovascular: Negative for chest pain and leg swelling.        No chest pain since the CABG   Gastrointestinal: Negative for abdominal pain, blood in stool, constipation, diarrhea, nausea and vomiting.   Endocrine: Negative for cold intolerance, heat intolerance and polydipsia.   Genitourinary: Negative for dysuria, hematuria, testicular pain and urgency.   Musculoskeletal: Negative for gait problem, joint swelling and myalgias.   Skin:        Midline chest incision has numerous staples still in place.   Neurological: Negative for dizziness, seizures and numbness.   Psychiatric/Behavioral: Negative for agitation, behavioral problems and hallucinations. The patient is not nervous/anxious.           Objective:      Vitals:    11/10/20 1208   BP: 112/72   Pulse: 76   Temp: 97.2 °F (36.2 °C)   Weight: 102.1 kg (225 lb)   Height: 6' (1.829 m)     Physical Exam  Vitals signs and nursing note reviewed.   Constitutional:       General: He is not in acute distress.     Appearance: He is well-developed. He is obese.   HENT:      Head: Normocephalic and atraumatic.      Right Ear: External ear normal.      Left Ear: External ear normal.      Nose: Nose normal.   Eyes:      Pupils: Pupils are equal, round, and reactive to light.   Neck:      Musculoskeletal: Normal range of motion and neck supple.      Thyroid: No thyromegaly.      Vascular: No carotid bruit.   Cardiovascular:      Rate and Rhythm: Normal rate and regular rhythm.      Heart sounds: Normal heart sounds. No murmur.      Comments: Midline chest scar healing well but numerous staples still in place  Pulmonary:       Effort: Pulmonary effort is normal.      Breath sounds: Normal breath sounds. No wheezing or rales.   Abdominal:      General: Bowel sounds are normal. There is no distension.      Palpations: Abdomen is soft.      Tenderness: There is no abdominal tenderness.   Musculoskeletal: Normal range of motion.         General: No tenderness or deformity.      Lumbar back: Normal. He exhibits no pain and no spasm.      Comments: Bends 90 degrees at  waist shoulders and knees have good range of motion no pitting edema noted.   Lymphadenopathy:      Cervical: No cervical adenopathy.   Skin:     General: Skin is warm and dry.      Findings: No rash.      Comments: Left leg incisions are healing well.   Neurological:      Mental Status: He is alert and oriented to person, place, and time.      Cranial Nerves: No cranial nerve deficit.      Coordination: Coordination normal.   Psychiatric:         Behavior: Behavior normal.         Thought Content: Thought content normal.         Judgment: Judgment normal.           Assessment:       1. Status post three vessel coronary artery bypass    2. Type 2 diabetes mellitus without complication, without long-term current use of insulin    3. Mixed hyperlipidemia    4. Gastroesophageal reflux disease without esophagitis    5. Family history of coronary artery disease         Plan:       Status post three vessel coronary artery bypass  Patient doing very well postoperatively 1 week from his CABG.  Continue home health nursing and wound care.  Okay for cardiac rehab when cleared by Cardiology  Type 2 diabetes mellitus without complication, without long-term current use of insulin  -     Discontinue: blood-glucose meter kit; To check BG 1 times daily, to use with insurance preferred meter  Dispense: 1 each; Refill: 0  -     Discontinue: lancets Misc; To check BG 1 times daily, to use with insurance preferred meter  Dispense: 100 each; Refill: 1  -     Discontinue: blood sugar diagnostic Strp;  To check BG 1 times daily, to use with insurance preferred meter  Dispense: 100 strip; Refill: 0  Supplies ordered  Mixed hyperlipidemia  Continue rosuvastatin 10 mg a  Gastroesophageal reflux disease without esophagitis    Family history of coronary artery disease      Follow up in about 2 months (around 1/10/2021), or cabg.        11/11/2020 Tyler Abel

## 2020-12-21 ENCOUNTER — PATIENT MESSAGE (OUTPATIENT)
Dept: FAMILY MEDICINE | Facility: CLINIC | Age: 65
End: 2020-12-21

## 2021-01-05 ENCOUNTER — TELEPHONE (OUTPATIENT)
Dept: FAMILY MEDICINE | Facility: CLINIC | Age: 66
End: 2021-01-05

## 2021-01-05 DIAGNOSIS — E11.9 TYPE 2 DIABETES MELLITUS WITHOUT COMPLICATION, WITHOUT LONG-TERM CURRENT USE OF INSULIN: ICD-10-CM

## 2021-01-05 DIAGNOSIS — Z00.00 ROUTINE GENERAL MEDICAL EXAMINATION AT A HEALTH CARE FACILITY: Primary | ICD-10-CM

## 2021-01-05 DIAGNOSIS — E78.2 MIXED HYPERLIPIDEMIA: ICD-10-CM

## 2021-01-05 DIAGNOSIS — Z79.899 ENCOUNTER FOR LONG-TERM (CURRENT) USE OF OTHER MEDICATIONS: ICD-10-CM

## 2021-01-06 ENCOUNTER — PATIENT MESSAGE (OUTPATIENT)
Dept: FAMILY MEDICINE | Facility: CLINIC | Age: 66
End: 2021-01-06

## 2021-01-13 LAB
ALBUMIN SERPL-MCNC: 4.4 G/DL (ref 3.6–5.1)
ALBUMIN/GLOB SERPL: 1.3 (CALC) (ref 1–2.5)
ALP SERPL-CCNC: 57 U/L (ref 35–144)
ALT SERPL-CCNC: 27 U/L (ref 9–46)
AST SERPL-CCNC: 19 U/L (ref 10–35)
BASOPHILS # BLD AUTO: 39 CELLS/UL (ref 0–200)
BASOPHILS NFR BLD AUTO: 0.7 %
BILIRUB SERPL-MCNC: 0.7 MG/DL (ref 0.2–1.2)
BUN SERPL-MCNC: 12 MG/DL (ref 7–25)
BUN/CREAT SERPL: ABNORMAL (CALC) (ref 6–22)
CALCIUM SERPL-MCNC: 9.5 MG/DL (ref 8.6–10.3)
CHLORIDE SERPL-SCNC: 100 MMOL/L (ref 98–110)
CHOLEST SERPL-MCNC: 182 MG/DL
CHOLEST/HDLC SERPL: 4 (CALC)
CO2 SERPL-SCNC: 31 MMOL/L (ref 20–32)
CREAT SERPL-MCNC: 0.91 MG/DL (ref 0.7–1.25)
EOSINOPHIL # BLD AUTO: 198 CELLS/UL (ref 15–500)
EOSINOPHIL NFR BLD AUTO: 3.6 %
ERYTHROCYTE [DISTWIDTH] IN BLOOD BY AUTOMATED COUNT: 13.1 % (ref 11–15)
GFRSERPLBLD MDRD-ARVRAT: 87 ML/MIN/1.73M2
GLOBULIN SER CALC-MCNC: 3.4 G/DL (CALC) (ref 1.9–3.7)
GLUCOSE SERPL-MCNC: 129 MG/DL (ref 65–99)
HBA1C MFR BLD: 6.1 % OF TOTAL HGB
HCT VFR BLD AUTO: 47 % (ref 38.5–50)
HDLC SERPL-MCNC: 46 MG/DL
HGB BLD-MCNC: 15.8 G/DL (ref 13.2–17.1)
LDLC SERPL CALC-MCNC: 104 MG/DL (CALC)
LYMPHOCYTES # BLD AUTO: 1738 CELLS/UL (ref 850–3900)
LYMPHOCYTES NFR BLD AUTO: 31.6 %
MCH RBC QN AUTO: 30.1 PG (ref 27–33)
MCHC RBC AUTO-ENTMCNC: 33.6 G/DL (ref 32–36)
MCV RBC AUTO: 89.5 FL (ref 80–100)
MONOCYTES # BLD AUTO: 550 CELLS/UL (ref 200–950)
MONOCYTES NFR BLD AUTO: 10 %
NEUTROPHILS # BLD AUTO: 2976 CELLS/UL (ref 1500–7800)
NEUTROPHILS NFR BLD AUTO: 54.1 %
NONHDLC SERPL-MCNC: 136 MG/DL (CALC)
PLATELET # BLD AUTO: 252 THOUSAND/UL (ref 140–400)
PMV BLD REES-ECKER: 10.1 FL (ref 7.5–12.5)
POTASSIUM SERPL-SCNC: 4.4 MMOL/L (ref 3.5–5.3)
PROT SERPL-MCNC: 7.8 G/DL (ref 6.1–8.1)
RBC # BLD AUTO: 5.25 MILLION/UL (ref 4.2–5.8)
SODIUM SERPL-SCNC: 140 MMOL/L (ref 135–146)
TRIGL SERPL-MCNC: 197 MG/DL
TSH SERPL-ACNC: 1.82 MIU/L (ref 0.4–4.5)
WBC # BLD AUTO: 5.5 THOUSAND/UL (ref 3.8–10.8)

## 2021-01-19 ENCOUNTER — OFFICE VISIT (OUTPATIENT)
Dept: FAMILY MEDICINE | Facility: CLINIC | Age: 66
End: 2021-01-19
Payer: MEDICARE

## 2021-01-19 VITALS
HEART RATE: 68 BPM | WEIGHT: 234 LBS | BODY MASS INDEX: 31.69 KG/M2 | HEIGHT: 72 IN | DIASTOLIC BLOOD PRESSURE: 70 MMHG | SYSTOLIC BLOOD PRESSURE: 122 MMHG

## 2021-01-19 DIAGNOSIS — K21.9 GASTROESOPHAGEAL REFLUX DISEASE WITHOUT ESOPHAGITIS: ICD-10-CM

## 2021-01-19 DIAGNOSIS — Z95.1 STATUS POST THREE VESSEL CORONARY ARTERY BYPASS: ICD-10-CM

## 2021-01-19 DIAGNOSIS — E78.2 MIXED HYPERLIPIDEMIA: ICD-10-CM

## 2021-01-19 DIAGNOSIS — Z82.49 FAMILY HISTORY OF CORONARY ARTERY DISEASE: ICD-10-CM

## 2021-01-19 DIAGNOSIS — E11.9 TYPE 2 DIABETES MELLITUS WITHOUT COMPLICATION, WITHOUT LONG-TERM CURRENT USE OF INSULIN: Primary | ICD-10-CM

## 2021-01-19 DIAGNOSIS — I10 BENIGN ESSENTIAL HTN: ICD-10-CM

## 2021-01-19 DIAGNOSIS — M19.071 ARTHRITIS OF RIGHT ANKLE: ICD-10-CM

## 2021-01-19 PROCEDURE — 99214 PR OFFICE/OUTPT VISIT, EST, LEVL IV, 30-39 MIN: ICD-10-PCS | Mod: S$GLB,,, | Performed by: FAMILY MEDICINE

## 2021-01-19 PROCEDURE — 99214 OFFICE O/P EST MOD 30 MIN: CPT | Mod: S$GLB,,, | Performed by: FAMILY MEDICINE

## 2021-01-19 RX ORDER — METOPROLOL TARTRATE 25 MG/1
25 TABLET, FILM COATED ORAL 2 TIMES DAILY
Qty: 180 TABLET | Refills: 1 | Status: SHIPPED | OUTPATIENT
Start: 2021-01-19 | End: 2021-04-26

## 2021-01-19 RX ORDER — ROSUVASTATIN CALCIUM 20 MG/1
20 TABLET, COATED ORAL DAILY
Qty: 90 TABLET | Refills: 1 | Status: SHIPPED | OUTPATIENT
Start: 2021-01-19 | End: 2021-04-26

## 2021-01-19 RX ORDER — METFORMIN HYDROCHLORIDE 750 MG/1
750 TABLET, EXTENDED RELEASE ORAL 2 TIMES DAILY WITH MEALS
Qty: 180 TABLET | Refills: 1 | Status: SHIPPED | OUTPATIENT
Start: 2021-01-19 | End: 2021-07-20 | Stop reason: SDUPTHER

## 2021-01-19 RX ORDER — OMEPRAZOLE 20 MG/1
20 CAPSULE, DELAYED RELEASE ORAL EVERY OTHER DAY
Qty: 45 CAPSULE | Refills: 1 | Status: SHIPPED | OUTPATIENT
Start: 2021-01-19 | End: 2021-07-20 | Stop reason: SDUPTHER

## 2021-01-19 RX ORDER — LOSARTAN POTASSIUM 50 MG/1
50 TABLET ORAL DAILY
Qty: 90 TABLET | Refills: 1 | Status: SHIPPED | OUTPATIENT
Start: 2021-01-19 | End: 2021-07-20 | Stop reason: SDUPTHER

## 2021-02-03 ENCOUNTER — TELEPHONE (OUTPATIENT)
Dept: FAMILY MEDICINE | Facility: CLINIC | Age: 66
End: 2021-02-03

## 2021-04-26 ENCOUNTER — PATIENT MESSAGE (OUTPATIENT)
Dept: FAMILY MEDICINE | Facility: CLINIC | Age: 66
End: 2021-04-26

## 2021-04-26 DIAGNOSIS — E78.2 MIXED HYPERLIPIDEMIA: ICD-10-CM

## 2021-04-26 RX ORDER — ROSUVASTATIN CALCIUM 20 MG/1
TABLET, COATED ORAL
COMMUNITY
End: 2021-07-20 | Stop reason: SDUPTHER

## 2021-06-22 ENCOUNTER — TELEPHONE (OUTPATIENT)
Dept: FAMILY MEDICINE | Facility: CLINIC | Age: 66
End: 2021-06-22

## 2021-06-22 DIAGNOSIS — M19.071 ARTHRITIS OF RIGHT ANKLE: ICD-10-CM

## 2021-06-22 RX ORDER — INDOMETHACIN 50 MG/1
50 CAPSULE ORAL
Qty: 60 CAPSULE | Refills: 1 | Status: SHIPPED | OUTPATIENT
Start: 2021-06-22 | End: 2022-03-25

## 2021-07-17 LAB
ALBUMIN SERPL-MCNC: 4.6 G/DL (ref 3.6–5.1)
ALBUMIN/GLOB SERPL: 1.5 (CALC) (ref 1–2.5)
ALP SERPL-CCNC: 54 U/L (ref 35–144)
ALT SERPL-CCNC: 52 U/L (ref 9–46)
AST SERPL-CCNC: 32 U/L (ref 10–35)
BILIRUB SERPL-MCNC: 0.6 MG/DL (ref 0.2–1.2)
BUN SERPL-MCNC: 13 MG/DL (ref 7–25)
BUN/CREAT SERPL: ABNORMAL (CALC) (ref 6–22)
CALCIUM SERPL-MCNC: 9.6 MG/DL (ref 8.6–10.3)
CHLORIDE SERPL-SCNC: 102 MMOL/L (ref 98–110)
CHOLEST SERPL-MCNC: 135 MG/DL
CHOLEST/HDLC SERPL: 3.2 (CALC)
CO2 SERPL-SCNC: 25 MMOL/L (ref 20–32)
CREAT SERPL-MCNC: 0.97 MG/DL (ref 0.7–1.25)
GLOBULIN SER CALC-MCNC: 3 G/DL (CALC) (ref 1.9–3.7)
GLUCOSE SERPL-MCNC: 131 MG/DL (ref 65–99)
HBA1C MFR BLD: 6.4 % OF TOTAL HGB
HDLC SERPL-MCNC: 42 MG/DL
LDLC SERPL CALC-MCNC: 69 MG/DL (CALC)
NONHDLC SERPL-MCNC: 93 MG/DL (CALC)
POTASSIUM SERPL-SCNC: 4.7 MMOL/L (ref 3.5–5.3)
PROT SERPL-MCNC: 7.6 G/DL (ref 6.1–8.1)
SODIUM SERPL-SCNC: 141 MMOL/L (ref 135–146)
TRIGL SERPL-MCNC: 163 MG/DL

## 2021-07-20 ENCOUNTER — OFFICE VISIT (OUTPATIENT)
Dept: FAMILY MEDICINE | Facility: CLINIC | Age: 66
End: 2021-07-20
Payer: MEDICARE

## 2021-07-20 VITALS
SYSTOLIC BLOOD PRESSURE: 128 MMHG | WEIGHT: 236 LBS | BODY MASS INDEX: 31.97 KG/M2 | DIASTOLIC BLOOD PRESSURE: 82 MMHG | HEIGHT: 72 IN | HEART RATE: 76 BPM

## 2021-07-20 DIAGNOSIS — E13.9 DIABETES 1.5, MANAGED AS TYPE 2: ICD-10-CM

## 2021-07-20 DIAGNOSIS — M19.071 ARTHRITIS OF RIGHT ANKLE: ICD-10-CM

## 2021-07-20 DIAGNOSIS — E78.2 MIXED HYPERLIPIDEMIA: ICD-10-CM

## 2021-07-20 DIAGNOSIS — Z95.1 STATUS POST THREE VESSEL CORONARY ARTERY BYPASS: ICD-10-CM

## 2021-07-20 DIAGNOSIS — K21.9 GASTROESOPHAGEAL REFLUX DISEASE WITHOUT ESOPHAGITIS: ICD-10-CM

## 2021-07-20 DIAGNOSIS — N52.01 ERECTILE DYSFUNCTION DUE TO ARTERIAL INSUFFICIENCY: ICD-10-CM

## 2021-07-20 DIAGNOSIS — Z82.49 FAMILY HISTORY OF CORONARY ARTERY DISEASE: ICD-10-CM

## 2021-07-20 DIAGNOSIS — I10 BENIGN ESSENTIAL HTN: ICD-10-CM

## 2021-07-20 DIAGNOSIS — E11.9 TYPE 2 DIABETES MELLITUS WITHOUT COMPLICATION, WITHOUT LONG-TERM CURRENT USE OF INSULIN: Primary | ICD-10-CM

## 2021-07-20 PROCEDURE — 99214 OFFICE O/P EST MOD 30 MIN: CPT | Mod: S$GLB,,, | Performed by: FAMILY MEDICINE

## 2021-07-20 PROCEDURE — 99214 PR OFFICE/OUTPT VISIT, EST, LEVL IV, 30-39 MIN: ICD-10-PCS | Mod: S$GLB,,, | Performed by: FAMILY MEDICINE

## 2021-07-20 RX ORDER — OMEPRAZOLE 20 MG/1
20 CAPSULE, DELAYED RELEASE ORAL EVERY OTHER DAY
Qty: 45 CAPSULE | Refills: 1 | Status: SHIPPED | OUTPATIENT
Start: 2021-07-20 | End: 2022-01-27 | Stop reason: SDUPTHER

## 2021-07-20 RX ORDER — LOSARTAN POTASSIUM 50 MG/1
50 TABLET ORAL DAILY
Qty: 90 TABLET | Refills: 1 | Status: SHIPPED | OUTPATIENT
Start: 2021-07-20 | End: 2022-01-13 | Stop reason: SDUPTHER

## 2021-07-20 RX ORDER — ROSUVASTATIN CALCIUM 20 MG/1
20 TABLET, COATED ORAL DAILY
Qty: 90 TABLET | Refills: 2 | Status: SHIPPED | OUTPATIENT
Start: 2021-07-20 | End: 2022-01-27 | Stop reason: SDUPTHER

## 2021-07-20 RX ORDER — METFORMIN HYDROCHLORIDE 750 MG/1
750 TABLET, EXTENDED RELEASE ORAL 2 TIMES DAILY WITH MEALS
Qty: 180 TABLET | Refills: 1 | Status: SHIPPED | OUTPATIENT
Start: 2021-07-20 | End: 2022-01-27 | Stop reason: SDUPTHER

## 2021-10-19 ENCOUNTER — PATIENT MESSAGE (OUTPATIENT)
Dept: FAMILY MEDICINE | Facility: CLINIC | Age: 66
End: 2021-10-19

## 2021-10-20 DIAGNOSIS — E11.9 TYPE 2 DIABETES MELLITUS WITHOUT COMPLICATION, WITHOUT LONG-TERM CURRENT USE OF INSULIN: ICD-10-CM

## 2021-10-20 DIAGNOSIS — E11.9 TYPE 2 DIABETES MELLITUS WITHOUT COMPLICATION, WITHOUT LONG-TERM CURRENT USE OF INSULIN: Primary | ICD-10-CM

## 2021-10-20 DIAGNOSIS — E13.9 DIABETES 1.5, MANAGED AS TYPE 2: Primary | ICD-10-CM

## 2021-10-20 RX ORDER — DAPAGLIFLOZIN 5 MG/1
5 TABLET, FILM COATED ORAL DAILY
Qty: 90 TABLET | Refills: 1 | Status: SHIPPED | OUTPATIENT
Start: 2021-10-20 | End: 2022-01-27 | Stop reason: SDUPTHER

## 2021-10-20 RX ORDER — CANAGLIFLOZIN 100 MG/1
100 TABLET, FILM COATED ORAL DAILY
Qty: 90 TABLET | Refills: 1 | Status: SHIPPED | OUTPATIENT
Start: 2021-10-20 | End: 2021-10-20

## 2022-01-04 ENCOUNTER — TELEPHONE (OUTPATIENT)
Dept: FAMILY MEDICINE | Facility: CLINIC | Age: 67
End: 2022-01-04
Payer: MEDICARE

## 2022-01-04 DIAGNOSIS — Z79.899 ENCOUNTER FOR LONG-TERM (CURRENT) USE OF OTHER MEDICATIONS: ICD-10-CM

## 2022-01-04 DIAGNOSIS — Z00.00 ROUTINE GENERAL MEDICAL EXAMINATION AT A HEALTH CARE FACILITY: Primary | ICD-10-CM

## 2022-01-04 DIAGNOSIS — E11.9 TYPE 2 DIABETES MELLITUS WITHOUT COMPLICATION, WITHOUT LONG-TERM CURRENT USE OF INSULIN: ICD-10-CM

## 2022-01-04 DIAGNOSIS — I10 BENIGN ESSENTIAL HTN: ICD-10-CM

## 2022-01-04 DIAGNOSIS — E78.2 MIXED HYPERLIPIDEMIA: ICD-10-CM

## 2022-01-04 DIAGNOSIS — Z12.5 SPECIAL SCREENING FOR MALIGNANT NEOPLASM OF PROSTATE: ICD-10-CM

## 2022-01-04 NOTE — TELEPHONE ENCOUNTER
----- Message from Irais Sotomayor sent at 1/4/2022  3:08 PM CST -----  Pt's wife calling wants to know does he needs labs before his appt 1/27/2022.   [phone]

## 2022-01-08 ENCOUNTER — PATIENT MESSAGE (OUTPATIENT)
Dept: FAMILY MEDICINE | Facility: CLINIC | Age: 67
End: 2022-01-08
Payer: MEDICARE

## 2022-01-08 LAB
ALBUMIN SERPL-MCNC: 4.6 G/DL (ref 3.6–5.1)
ALBUMIN/CREAT UR: 4 MCG/MG CREAT
ALBUMIN/GLOB SERPL: 1.5 (CALC) (ref 1–2.5)
ALP SERPL-CCNC: 53 U/L (ref 35–144)
ALT SERPL-CCNC: 52 U/L (ref 9–46)
APPEARANCE UR: CLEAR
AST SERPL-CCNC: 35 U/L (ref 10–35)
BACTERIA #/AREA URNS HPF: ABNORMAL /HPF
BACTERIA UR CULT: ABNORMAL
BASOPHILS # BLD AUTO: 52 CELLS/UL (ref 0–200)
BASOPHILS NFR BLD AUTO: 0.8 %
BILIRUB SERPL-MCNC: 0.6 MG/DL (ref 0.2–1.2)
BILIRUB UR QL STRIP: NEGATIVE
BUN SERPL-MCNC: 13 MG/DL (ref 7–25)
BUN/CREAT SERPL: ABNORMAL (CALC) (ref 6–22)
CALCIUM SERPL-MCNC: 9.5 MG/DL (ref 8.6–10.3)
CHLORIDE SERPL-SCNC: 101 MMOL/L (ref 98–110)
CHOLEST SERPL-MCNC: 143 MG/DL
CHOLEST/HDLC SERPL: 3.3 (CALC)
CO2 SERPL-SCNC: 28 MMOL/L (ref 20–32)
COLOR UR: YELLOW
CREAT SERPL-MCNC: 1.04 MG/DL (ref 0.7–1.25)
CREAT UR-MCNC: 179 MG/DL (ref 20–320)
EOSINOPHIL # BLD AUTO: 260 CELLS/UL (ref 15–500)
EOSINOPHIL NFR BLD AUTO: 4 %
ERYTHROCYTE [DISTWIDTH] IN BLOOD BY AUTOMATED COUNT: 13 % (ref 11–15)
GLOBULIN SER CALC-MCNC: 3.1 G/DL (CALC) (ref 1.9–3.7)
GLUCOSE SERPL-MCNC: 129 MG/DL (ref 65–99)
GLUCOSE UR QL STRIP: ABNORMAL
HBA1C MFR BLD: 6.6 % OF TOTAL HGB
HCT VFR BLD AUTO: 48.7 % (ref 38.5–50)
HDLC SERPL-MCNC: 44 MG/DL
HGB BLD-MCNC: 17.1 G/DL (ref 13.2–17.1)
HGB UR QL STRIP: NEGATIVE
HYALINE CASTS #/AREA URNS LPF: ABNORMAL /LPF
KETONES UR QL STRIP: NEGATIVE
LDLC SERPL CALC-MCNC: 72 MG/DL (CALC)
LEUKOCYTE ESTERASE UR QL STRIP: NEGATIVE
LYMPHOCYTES # BLD AUTO: 2067 CELLS/UL (ref 850–3900)
LYMPHOCYTES NFR BLD AUTO: 31.8 %
MCH RBC QN AUTO: 32.1 PG (ref 27–33)
MCHC RBC AUTO-ENTMCNC: 35.1 G/DL (ref 32–36)
MCV RBC AUTO: 91.5 FL (ref 80–100)
MICROALBUMIN UR-MCNC: 0.8 MG/DL
MONOCYTES # BLD AUTO: 722 CELLS/UL (ref 200–950)
MONOCYTES NFR BLD AUTO: 11.1 %
NEUTROPHILS # BLD AUTO: 3400 CELLS/UL (ref 1500–7800)
NEUTROPHILS NFR BLD AUTO: 52.3 %
NITRITE UR QL STRIP: NEGATIVE
NONHDLC SERPL-MCNC: 99 MG/DL (CALC)
PH UR STRIP: 5.5 [PH] (ref 5–8)
PLATELET # BLD AUTO: 208 THOUSAND/UL (ref 140–400)
PMV BLD REES-ECKER: 10.1 FL (ref 7.5–12.5)
POTASSIUM SERPL-SCNC: 4.6 MMOL/L (ref 3.5–5.3)
PROT SERPL-MCNC: 7.7 G/DL (ref 6.1–8.1)
PROT UR QL STRIP: NEGATIVE
PSA SERPL-MCNC: 0.52 NG/ML
RBC # BLD AUTO: 5.32 MILLION/UL (ref 4.2–5.8)
RBC #/AREA URNS HPF: ABNORMAL /HPF
SODIUM SERPL-SCNC: 140 MMOL/L (ref 135–146)
SP GR UR STRIP: 1.03 (ref 1–1.03)
SQUAMOUS #/AREA URNS HPF: ABNORMAL /HPF
TRIGL SERPL-MCNC: 206 MG/DL
TSH SERPL-ACNC: 1.78 MIU/L (ref 0.4–4.5)
WBC # BLD AUTO: 6.5 THOUSAND/UL (ref 3.8–10.8)
WBC #/AREA URNS HPF: ABNORMAL /HPF

## 2022-01-13 ENCOUNTER — PATIENT MESSAGE (OUTPATIENT)
Dept: FAMILY MEDICINE | Facility: CLINIC | Age: 67
End: 2022-01-13
Payer: MEDICARE

## 2022-01-13 DIAGNOSIS — I10 BENIGN ESSENTIAL HTN: ICD-10-CM

## 2022-01-13 RX ORDER — LOSARTAN POTASSIUM 50 MG/1
50 TABLET ORAL DAILY
Qty: 90 TABLET | Refills: 1 | Status: SHIPPED | OUTPATIENT
Start: 2022-01-13 | End: 2022-01-27 | Stop reason: SDUPTHER

## 2022-01-18 NOTE — TELEPHONE ENCOUNTER
Please resend    37 yo F morbidly obese with pmh of ankylosing spondylitis c/o generalized fatigue x 1 week. Associated with heaviness in the chest and sob, mostly with walking. Pt states she had 2 covid tests, both negative and a negative rapid flu yesterday. Associated with mild aches, chills and HA. Denies fever, cough, n/v/d, abd pain, dizziness. O2 sat 99% on RA. Afebrile. Not tachypneic. Lungs cta b/l, cardio rrr nml s1s2. EKG NSR @ 95, no ischemia. Will check labs including CE, ddimer (give only risk factor is obesity), cxr, covid swab.

## 2022-01-27 ENCOUNTER — OFFICE VISIT (OUTPATIENT)
Dept: FAMILY MEDICINE | Facility: CLINIC | Age: 67
End: 2022-01-27
Payer: MEDICARE

## 2022-01-27 VITALS
BODY MASS INDEX: 31.56 KG/M2 | HEART RATE: 74 BPM | WEIGHT: 233 LBS | DIASTOLIC BLOOD PRESSURE: 72 MMHG | SYSTOLIC BLOOD PRESSURE: 118 MMHG | HEIGHT: 72 IN

## 2022-01-27 DIAGNOSIS — E78.2 MIXED HYPERLIPIDEMIA: ICD-10-CM

## 2022-01-27 DIAGNOSIS — E11.9 TYPE 2 DIABETES MELLITUS WITHOUT COMPLICATION, WITHOUT LONG-TERM CURRENT USE OF INSULIN: ICD-10-CM

## 2022-01-27 DIAGNOSIS — K21.9 GASTROESOPHAGEAL REFLUX DISEASE WITHOUT ESOPHAGITIS: ICD-10-CM

## 2022-01-27 DIAGNOSIS — Z82.49 FAMILY HISTORY OF CORONARY ARTERY DISEASE: ICD-10-CM

## 2022-01-27 DIAGNOSIS — I10 BENIGN ESSENTIAL HTN: ICD-10-CM

## 2022-01-27 DIAGNOSIS — Z95.1 STATUS POST THREE VESSEL CORONARY ARTERY BYPASS: Primary | ICD-10-CM

## 2022-01-27 PROCEDURE — 1101F PR PT FALLS ASSESS DOC 0-1 FALLS W/OUT INJ PAST YR: ICD-10-PCS | Mod: S$GLB,,, | Performed by: FAMILY MEDICINE

## 2022-01-27 PROCEDURE — 3044F HG A1C LEVEL LT 7.0%: CPT | Mod: S$GLB,,, | Performed by: FAMILY MEDICINE

## 2022-01-27 PROCEDURE — 3008F PR BODY MASS INDEX (BMI) DOCUMENTED: ICD-10-PCS | Mod: S$GLB,,, | Performed by: FAMILY MEDICINE

## 2022-01-27 PROCEDURE — 1101F PT FALLS ASSESS-DOCD LE1/YR: CPT | Mod: S$GLB,,, | Performed by: FAMILY MEDICINE

## 2022-01-27 PROCEDURE — 3078F PR MOST RECENT DIASTOLIC BLOOD PRESSURE < 80 MM HG: ICD-10-PCS | Mod: S$GLB,,, | Performed by: FAMILY MEDICINE

## 2022-01-27 PROCEDURE — 99214 PR OFFICE/OUTPT VISIT, EST, LEVL IV, 30-39 MIN: ICD-10-PCS | Mod: S$GLB,,, | Performed by: FAMILY MEDICINE

## 2022-01-27 PROCEDURE — 3066F PR DOCUMENTATION OF TREATMENT FOR NEPHROPATHY: ICD-10-PCS | Mod: S$GLB,,, | Performed by: FAMILY MEDICINE

## 2022-01-27 PROCEDURE — 3078F DIAST BP <80 MM HG: CPT | Mod: S$GLB,,, | Performed by: FAMILY MEDICINE

## 2022-01-27 PROCEDURE — 99214 OFFICE O/P EST MOD 30 MIN: CPT | Mod: S$GLB,,, | Performed by: FAMILY MEDICINE

## 2022-01-27 PROCEDURE — 3288F PR FALLS RISK ASSESSMENT DOCUMENTED: ICD-10-PCS | Mod: S$GLB,,, | Performed by: FAMILY MEDICINE

## 2022-01-27 PROCEDURE — 4010F PR ACE/ARB THEARPY RXD/TAKEN: ICD-10-PCS | Mod: S$GLB,,, | Performed by: FAMILY MEDICINE

## 2022-01-27 PROCEDURE — 3008F BODY MASS INDEX DOCD: CPT | Mod: S$GLB,,, | Performed by: FAMILY MEDICINE

## 2022-01-27 PROCEDURE — 3074F PR MOST RECENT SYSTOLIC BLOOD PRESSURE < 130 MM HG: ICD-10-PCS | Mod: S$GLB,,, | Performed by: FAMILY MEDICINE

## 2022-01-27 PROCEDURE — 3074F SYST BP LT 130 MM HG: CPT | Mod: S$GLB,,, | Performed by: FAMILY MEDICINE

## 2022-01-27 PROCEDURE — 3288F FALL RISK ASSESSMENT DOCD: CPT | Mod: S$GLB,,, | Performed by: FAMILY MEDICINE

## 2022-01-27 PROCEDURE — 3061F NEG MICROALBUMINURIA REV: CPT | Mod: S$GLB,,, | Performed by: FAMILY MEDICINE

## 2022-01-27 PROCEDURE — 4010F ACE/ARB THERAPY RXD/TAKEN: CPT | Mod: S$GLB,,, | Performed by: FAMILY MEDICINE

## 2022-01-27 PROCEDURE — 3044F PR MOST RECENT HEMOGLOBIN A1C LEVEL <7.0%: ICD-10-PCS | Mod: S$GLB,,, | Performed by: FAMILY MEDICINE

## 2022-01-27 PROCEDURE — 1159F PR MEDICATION LIST DOCUMENTED IN MEDICAL RECORD: ICD-10-PCS | Mod: S$GLB,,, | Performed by: FAMILY MEDICINE

## 2022-01-27 PROCEDURE — 3066F NEPHROPATHY DOC TX: CPT | Mod: S$GLB,,, | Performed by: FAMILY MEDICINE

## 2022-01-27 PROCEDURE — 3061F PR NEG MICROALBUMINURIA RESULT DOCUMENTED/REVIEW: ICD-10-PCS | Mod: S$GLB,,, | Performed by: FAMILY MEDICINE

## 2022-01-27 PROCEDURE — 1159F MED LIST DOCD IN RCRD: CPT | Mod: S$GLB,,, | Performed by: FAMILY MEDICINE

## 2022-01-27 RX ORDER — ROSUVASTATIN CALCIUM 20 MG/1
20 TABLET, COATED ORAL DAILY
Qty: 90 TABLET | Refills: 2 | Status: SHIPPED | OUTPATIENT
Start: 2022-01-27 | End: 2022-08-08 | Stop reason: SDUPTHER

## 2022-01-27 RX ORDER — OMEPRAZOLE 20 MG/1
20 CAPSULE, DELAYED RELEASE ORAL EVERY OTHER DAY
Qty: 45 CAPSULE | Refills: 1 | Status: SHIPPED | OUTPATIENT
Start: 2022-01-27 | End: 2022-08-08 | Stop reason: SDUPTHER

## 2022-01-27 RX ORDER — METFORMIN HYDROCHLORIDE 750 MG/1
750 TABLET, EXTENDED RELEASE ORAL 2 TIMES DAILY WITH MEALS
Qty: 180 TABLET | Refills: 1 | Status: SHIPPED | OUTPATIENT
Start: 2022-01-27 | End: 2022-08-08 | Stop reason: SDUPTHER

## 2022-01-27 RX ORDER — DAPAGLIFLOZIN 5 MG/1
5 TABLET, FILM COATED ORAL DAILY
Qty: 90 TABLET | Refills: 1 | Status: SHIPPED | OUTPATIENT
Start: 2022-01-27 | End: 2022-08-08 | Stop reason: SDUPTHER

## 2022-01-27 RX ORDER — LOSARTAN POTASSIUM 50 MG/1
50 TABLET ORAL DAILY
Qty: 90 TABLET | Refills: 1 | Status: SHIPPED | OUTPATIENT
Start: 2022-01-27 | End: 2022-08-08 | Stop reason: SDUPTHER

## 2022-01-27 NOTE — PROGRESS NOTES
SUBJECTIVE:    Patient ID: Jacob Lou is a 66 y.o. male.    Chief Complaint: Diabetes (Brought bottles // need refills // Lab-results //Foot exam ordered //abc)    This 66-year-old male is here for six-month checkup.  He has 3 vessel CABG in 11/2020.  He follows up with Dr. garcia cardiology.  He walks 2 miles a day weather permitting every day.  He feels no chest pain or shortness of breath.    Diabetes type 2.  Patient has lost 3 lb and is trying to reduce his carbohydrate intake.  He takes Farxiga and metformin..  Today's A1c is 6. 6.    November 2017-colonoscopy with Dr. Medina -[facility] 5 years    Hyperlipidemia-takes rosuvastatin 20 mg daily.  myalgias are mild and  manageable       Telephone on 01/04/2022   Component Date Value Ref Range Status    WBC 01/07/2022 6.5  3.8 - 10.8 Thousand/uL Final    RBC 01/07/2022 5.32  4.20 - 5.80 Million/uL Final    Hemoglobin 01/07/2022 17.1  13.2 - 17.1 g/dL Final    Hematocrit 01/07/2022 48.7  38.5 - 50.0 % Final    MCV 01/07/2022 91.5  80.0 - 100.0 fL Final    MCH 01/07/2022 32.1  27.0 - 33.0 pg Final    MCHC 01/07/2022 35.1  32.0 - 36.0 g/dL Final    RDW 01/07/2022 13.0  11.0 - 15.0 % Final    Platelets 01/07/2022 208  140 - 400 Thousand/uL Final    MPV 01/07/2022 10.1  7.5 - 12.5 fL Final    Neutrophils, Abs 01/07/2022 3,400  1,500 - 7,800 cells/uL Final    Lymph # 01/07/2022 2,067  850 - 3,900 cells/uL Final    Mono # 01/07/2022 722  200 - 950 cells/uL Final    Eos # 01/07/2022 260  15 - 500 cells/uL Final    Baso # 01/07/2022 52  0 - 200 cells/uL Final    Neutrophils Relative 01/07/2022 52.3  % Final    Lymph % 01/07/2022 31.8  % Final    Mono % 01/07/2022 11.1  % Final    Eosinophil % 01/07/2022 4.0  % Final    Basophil % 01/07/2022 0.8  % Final    Glucose 01/07/2022 129* 65 - 99 mg/dL Final    BUN 01/07/2022 13  7 - 25 mg/dL Final    Creatinine 01/07/2022 1.04  0.70 - 1.25 mg/dL Final    eGFR if non  01/07/2022 73  > OR = 60  mL/min/1.73m2 Final    eGFR if  01/07/2022 85  > OR = 60 mL/min/1.73m2 Final    BUN/Creatinine Ratio 01/07/2022 NOT APPLICABLE  6 - 22 (calc) Final    Sodium 01/07/2022 140  135 - 146 mmol/L Final    Potassium 01/07/2022 4.6  3.5 - 5.3 mmol/L Final    Chloride 01/07/2022 101  98 - 110 mmol/L Final    CO2 01/07/2022 28  20 - 32 mmol/L Final    Calcium 01/07/2022 9.5  8.6 - 10.3 mg/dL Final    Total Protein 01/07/2022 7.7  6.1 - 8.1 g/dL Final    Albumin 01/07/2022 4.6  3.6 - 5.1 g/dL Final    Globulin, Total 01/07/2022 3.1  1.9 - 3.7 g/dL (calc) Final    Albumin/Globulin Ratio 01/07/2022 1.5  1.0 - 2.5 (calc) Final    Total Bilirubin 01/07/2022 0.6  0.2 - 1.2 mg/dL Final    Alkaline Phosphatase 01/07/2022 53  35 - 144 U/L Final    AST 01/07/2022 35  10 - 35 U/L Final    ALT 01/07/2022 52* 9 - 46 U/L Final    Cholesterol 01/07/2022 143  <200 mg/dL Final    HDL 01/07/2022 44  > OR = 40 mg/dL Final    Triglycerides 01/07/2022 206* <150 mg/dL Final    LDL Cholesterol 01/07/2022 72  mg/dL (calc) Final    HDL/Cholesterol Ratio 01/07/2022 3.3  <5.0 (calc) Final    Non HDL Chol. (LDL+VLDL) 01/07/2022 99  <130 mg/dL (calc) Final    Creatinine, Urine 01/07/2022 179  20 - 320 mg/dL Final    Microalb, Ur 01/07/2022 0.8  See Note: mg/dL Final    Microalb/Creat Ratio 01/07/2022 4  <30 mcg/mg creat Final    TSH 01/07/2022 1.78  0.40 - 4.50 mIU/L Final    Color, UA 01/07/2022 YELLOW  YELLOW Final    Appearance, UA 01/07/2022 CLEAR  CLEAR Final    Specific [place], UA 01/07/2022 1.026  1.001 - 1.035 Final    pH, UA 01/07/2022 5.5  5.0 - 8.0 Final    Glucose, UA 01/07/2022 3+* NEGATIVE Final    Bilirubin, UA 01/07/2022 NEGATIVE  NEGATIVE Final    Ketones, UA 01/07/2022 NEGATIVE  NEGATIVE Final    Occult Blood UA 01/07/2022 NEGATIVE  NEGATIVE Final    Protein, UA 01/07/2022 NEGATIVE  NEGATIVE Final    Nitrite, UA 01/07/2022 NEGATIVE  NEGATIVE Final    Leukocytes, UA 01/07/2022  NEGATIVE  NEGATIVE Final    WBC Casts, UA 01/07/2022 NONE SEEN  < OR = 5 /HPF Final    RBC Casts, UA 01/07/2022 NONE SEEN  < OR = 2 /HPF Final    Squam Epithel, UA 01/07/2022 NONE SEEN  < OR = 5 /HPF Final    Bacteria, UA 01/07/2022 NONE SEEN  NONE SEEN /HPF Final    Hyaline Casts, UA 01/07/2022 NONE SEEN  NONE SEEN /LPF Final    Reflexive Urine Culture 01/07/2022    Final    Hemoglobin A1C 01/07/2022 6.6* <5.7 % of total Hgb Final    PROSTATE SPECIFIC ANTIGEN, SCR - Q* 01/07/2022 0.52  < OR = 4.00 ng/mL Final       Past Medical History:   Diagnosis Date    GERD (gastroesophageal reflux disease)     Hyperlipidemia     Squamous cell carcinoma of skin      Social History     Socioeconomic History    Marital status:    Tobacco Use    Smoking status: Never Smoker    Smokeless tobacco: Never Used   Substance and Sexual Activity    Alcohol use: Yes     Alcohol/week: 1.0 standard drink     Types: 1 Glasses of wine per week    Drug use: No    Sexual activity: Yes     Partners: Female     Social Determinants of Health     Financial Resource Strain: Unknown    Difficulty of Paying Living Expenses: Patient refused   Food Insecurity: Unknown    Worried About Running Out of Food in the Last Year: Patient refused    Ran Out of Food in the Last Year: Patient refused   Transportation Needs: Unknown    Lack of Transportation (Medical): Patient refused    Lack of Transportation (Non-Medical): Patient refused   Physical Activity: Sufficiently Active    Days of Exercise per Week: 3 days    Minutes of Exercise per Session: 60 min   Stress: No Stress Concern Present    Feeling of Stress : Only a little   Social Connections: Unknown    Frequency of Communication with Friends and Family: More than three times a week    Frequency of Social Gatherings with Friends and Family: Twice a week    Active Member of Clubs or Organizations: Yes    Attends Club or Organization Meetings: More than 4 times per year     Marital Status:    Housing Stability: Unknown    Unable to Pay for Housing in the Last Year: Patient refused    Unstable Housing in the Last Year: Patient refused     Past Surgical History:   Procedure Laterality Date    cataracts  2019    Dr MARYANN Martinez    COLONOSCOPY N/A 11/30/2017    Procedure: COLONOSCOPY;  Surgeon: Maikel Medina MD;  Location: [facility];  Service: Endoscopy;  Laterality: N/A;    CORONARY ARTERY BYPASS GRAFT  11/02/2020    Dr Mims- 3 vessel    EYE SURGERY  April 2019    FOOT SURGERY      HERNIA REPAIR       Family History   Problem Relation Age of Onset    Diabetes Mother     Hypertension Father        Review of patient's allergies indicates:  No Known Allergies    Current Outpatient Medications:     blood sugar diagnostic Strp, To check BG 1 times daily, to use with insurance preferred meter, Disp: 100 strip, Rfl: 0    ibuprofen (ADVIL,MOTRIN) 200 MG tablet, Take 200 mg by mouth every 8 (eight) hours as needed for Pain., Disp: , Rfl:     indomethacin (INDOCIN) 50 MG capsule, Take 1 capsule (50 mg total) by mouth 3 (three) times daily with meals., Disp: 60 capsule, Rfl: 1    lancets Misc, To check BG 1 times daily, to use with insurance preferred meter, Disp: 100 each, Rfl: 1    sildenafil (REVATIO) 20 mg Tab, Take 2-5 tablets p.o. p.r.n. sex, Disp: 30 tablet, Rfl: 5    bempedoic acid 180 mg Tab, Take by mouth., Disp: , Rfl:     blood-glucose meter kit, To check BG 1 times daily, to use with insurance preferred meter, Disp: 1 each, Rfl: 0    dapagliflozin (FARXIGA) 5 mg Tab tablet, Take 1 tablet (5 mg total) by mouth once daily., Disp: 90 tablet, Rfl: 1    losartan (COZAAR) 50 MG tablet, Take 1 tablet (50 mg total) by mouth once daily., Disp: 90 tablet, Rfl: 1    metFORMIN (GLUCOPHAGE-XR) 750 MG ER 24hr tablet, Take 1 tablet (750 mg total) by mouth 2 (two) times daily with meals., Disp: 180 tablet, Rfl: 1    omeprazole (PRILOSEC) 20 MG capsule, Take 1 capsule (20  mg total) by mouth every other day., Disp: 45 capsule, Rfl: 1    rosuvastatin (CRESTOR) 20 MG tablet, Take 1 tablet (20 mg total) by mouth once daily., Disp: 90 tablet, Rfl: 2    Review of Systems   Constitutional: Negative for appetite change, chills, fatigue, fever and unexpected weight change.   HENT: Negative for congestion, ear pain and trouble swallowing.    Eyes: Negative for pain, discharge and visual disturbance.   Respiratory: Negative for apnea, cough, shortness of breath and wheezing.    Cardiovascular: Negative for chest pain and leg swelling.   Gastrointestinal: Negative for abdominal pain, blood in stool, constipation, diarrhea, nausea and vomiting.   Endocrine: Negative for cold intolerance, heat intolerance and polydipsia.   Genitourinary: Negative for dysuria, hematuria, testicular pain and urgency.        Nocturia  1 x  night   Musculoskeletal: Positive for arthralgias (ibuprofen prn foot pain). Negative for gait problem, joint swelling and myalgias.   Neurological: Negative for dizziness, seizures and numbness.   Psychiatric/Behavioral: Negative for agitation, behavioral problems and hallucinations. The patient is not nervous/anxious.           Objective:      Vitals:    01/27/22 0836   BP: 118/72   Pulse: 74   Weight: 105.7 kg (233 lb)   Height: 6' (1.829 m)     Physical Exam  Vitals and nursing note reviewed.   Constitutional:       General: He is not in acute distress.     Appearance: He is well-developed and well-nourished. He is obese. He is not toxic-appearing.   HENT:      Head: Normocephalic and atraumatic.      Right Ear: Tympanic membrane and external ear normal.      Left Ear: Tympanic membrane and external ear normal.      Nose: Nose normal.      Mouth/Throat:      Mouth: Oropharynx is clear and moist.      Pharynx: Oropharynx is clear.   Eyes:      Extraocular Movements: EOM normal.      Pupils: Pupils are equal, round, and reactive to light.   Neck:      Thyroid: No thyromegaly.       Vascular: No carotid bruit.   Cardiovascular:      Rate and Rhythm: Normal rate and regular rhythm.      Pulses: Intact distal pulses.      Heart sounds: Normal heart sounds. No murmur heard.      Pulmonary:      Effort: Pulmonary effort is normal.      Breath sounds: Normal breath sounds. No wheezing or rales.   Abdominal:      General: Bowel sounds are normal. There is no distension.      Palpations: Abdomen is soft. There is no hepatosplenomegaly.      Tenderness: There is no abdominal tenderness.   Musculoskeletal:         General: No tenderness or deformity. Normal range of motion.      Cervical back: Normal range of motion and neck supple.      Lumbar back: Normal. No pain or spasms.      Comments: Bends 90 degrees at  Waist, shoulders  And knees  Good  rom   Feet:      Right foot:      Protective Sensation: 5 sites tested. 5 sites sensed.      Skin integrity: No callus.      Left foot:      Protective Sensation: 5 sites tested. 5 sites sensed.      Skin integrity: No callus.   Lymphadenopathy:      Cervical: No cervical adenopathy.   Skin:     General: Skin is warm and dry.      Findings: No rash.   Neurological:      Mental Status: He is alert and oriented to person, place, and time. Mental status is at baseline.      Cranial Nerves: No cranial nerve deficit.      Coordination: Coordination normal.   Psychiatric:         Mood and Affect: Mood and affect normal.         Behavior: Behavior normal.         Thought Content: Thought content normal.         Judgment: Judgment normal.           Assessment:       1. Status post three vessel coronary artery bypass    2. Gastroesophageal reflux disease without esophagitis    3. Type 2 diabetes mellitus without complication, without long-term current use of insulin    4. Mixed hyperlipidemia    5. Type 2 diabetes mellitus without complication, without long-term current use of insulin    6. Benign essential HTN    7. Family history of coronary artery disease          Plan:       Status post three vessel coronary artery bypass  Patient doing very well cardiac-wise.  Gastroesophageal reflux disease without esophagitis  Comments:  Doing well with omeprazole. continue as is.  Orders:  -     omeprazole (PRILOSEC) 20 MG capsule; Take 1 capsule (20 mg total) by mouth every other day.  Dispense: 45 capsule; Refill: 1    Type 2 diabetes mellitus without complication, without long-term current use of insulin  -     Foot Exam Performed  -     dapagliflozin (FARXIGA) 5 mg Tab tablet; Take 1 tablet (5 mg total) by mouth once daily.  Dispense: 90 tablet; Refill: 1  -     metFORMIN (GLUCOPHAGE-XR) 750 MG ER 24hr tablet; Take 1 tablet (750 mg total) by mouth 2 (two) times daily with meals.  Dispense: 180 tablet; Refill: 1  A1c down to 6.6.  Continue current regimen reduce carbohydrates and continue weight loss efforts.  Mixed hyperlipidemia  -     rosuvastatin (CRESTOR) 20 MG tablet; Take 1 tablet (20 mg total) by mouth once daily.  Dispense: 90 tablet; Refill: 2  Cholesterol 143 HDL 44  LDL 72 excellent lipid panel.  Type 2 diabetes mellitus without complication, without long-term current use of insulin  Comments:  Greatly improved to 6.6% today. NO changes  Orders:  -     Foot Exam Performed  -     dapagliflozin (FARXIGA) 5 mg Tab tablet; Take 1 tablet (5 mg total) by mouth once daily.  Dispense: 90 tablet; Refill: 1  -     metFORMIN (GLUCOPHAGE-XR) 750 MG ER 24hr tablet; Take 1 tablet (750 mg total) by mouth 2 (two) times daily with meals.  Dispense: 180 tablet; Refill: 1    Benign essential HTN  -     losartan (COZAAR) 50 MG tablet; Take 1 tablet (50 mg total) by mouth once daily.  Dispense: 90 tablet; Refill: 1    Family history of coronary artery disease      Follow up in about 6 months (around 7/27/2022) for Diabetic Check-Up.        1/29/2022 Tyler Abel

## 2022-06-15 ENCOUNTER — TELEPHONE (OUTPATIENT)
Dept: FAMILY MEDICINE | Facility: CLINIC | Age: 67
End: 2022-06-15

## 2022-06-15 NOTE — TELEPHONE ENCOUNTER
----- Message from Miguelina Montero sent at 6/15/2022 11:36 AM CDT -----  Patient wife (Nery)called and stated that they are out of town and he is feeling bad he has cough and congestion and she stated she did a home test for Covid and it was negative she would like for him to be seen either Thursday late afternoon or Friday she stated that they are out of town please give her a call at [phone] or [phone]

## 2022-06-15 NOTE — TELEPHONE ENCOUNTER
Spoke with pts wife and let her know to give us a call back when they are in town and we can see if we can work him in at that time

## 2022-06-16 ENCOUNTER — OFFICE VISIT (OUTPATIENT)
Dept: FAMILY MEDICINE | Facility: CLINIC | Age: 67
End: 2022-06-16
Payer: MEDICARE

## 2022-06-16 VITALS
HEART RATE: 78 BPM | DIASTOLIC BLOOD PRESSURE: 80 MMHG | WEIGHT: 230 LBS | HEIGHT: 72 IN | BODY MASS INDEX: 31.15 KG/M2 | SYSTOLIC BLOOD PRESSURE: 138 MMHG

## 2022-06-16 DIAGNOSIS — J20.9 ACUTE BRONCHITIS, UNSPECIFIED ORGANISM: ICD-10-CM

## 2022-06-16 DIAGNOSIS — J01.40 ACUTE NON-RECURRENT PANSINUSITIS: Primary | ICD-10-CM

## 2022-06-16 DIAGNOSIS — Z95.1 STATUS POST THREE VESSEL CORONARY ARTERY BYPASS: ICD-10-CM

## 2022-06-16 DIAGNOSIS — E11.9 TYPE 2 DIABETES MELLITUS WITHOUT COMPLICATION, WITHOUT LONG-TERM CURRENT USE OF INSULIN: ICD-10-CM

## 2022-06-16 DIAGNOSIS — E78.2 MIXED HYPERLIPIDEMIA: ICD-10-CM

## 2022-06-16 PROCEDURE — 1159F MED LIST DOCD IN RCRD: CPT | Mod: CPTII,S$GLB,, | Performed by: FAMILY MEDICINE

## 2022-06-16 PROCEDURE — 3079F DIAST BP 80-89 MM HG: CPT | Mod: CPTII,S$GLB,, | Performed by: FAMILY MEDICINE

## 2022-06-16 PROCEDURE — 96372 THER/PROPH/DIAG INJ SC/IM: CPT | Mod: S$GLB,,, | Performed by: FAMILY MEDICINE

## 2022-06-16 PROCEDURE — 1101F PT FALLS ASSESS-DOCD LE1/YR: CPT | Mod: CPTII,S$GLB,, | Performed by: FAMILY MEDICINE

## 2022-06-16 PROCEDURE — 1101F PR PT FALLS ASSESS DOC 0-1 FALLS W/OUT INJ PAST YR: ICD-10-PCS | Mod: CPTII,S$GLB,, | Performed by: FAMILY MEDICINE

## 2022-06-16 PROCEDURE — 3079F PR MOST RECENT DIASTOLIC BLOOD PRESSURE 80-89 MM HG: ICD-10-PCS | Mod: CPTII,S$GLB,, | Performed by: FAMILY MEDICINE

## 2022-06-16 PROCEDURE — 4010F ACE/ARB THERAPY RXD/TAKEN: CPT | Mod: CPTII,S$GLB,, | Performed by: FAMILY MEDICINE

## 2022-06-16 PROCEDURE — 3008F BODY MASS INDEX DOCD: CPT | Mod: CPTII,S$GLB,, | Performed by: FAMILY MEDICINE

## 2022-06-16 PROCEDURE — 99213 PR OFFICE/OUTPT VISIT, EST, LEVL III, 20-29 MIN: ICD-10-PCS | Mod: 25,S$GLB,, | Performed by: FAMILY MEDICINE

## 2022-06-16 PROCEDURE — 3044F HG A1C LEVEL LT 7.0%: CPT | Mod: CPTII,S$GLB,, | Performed by: FAMILY MEDICINE

## 2022-06-16 PROCEDURE — 3066F PR DOCUMENTATION OF TREATMENT FOR NEPHROPATHY: ICD-10-PCS | Mod: CPTII,S$GLB,, | Performed by: FAMILY MEDICINE

## 2022-06-16 PROCEDURE — 3061F NEG MICROALBUMINURIA REV: CPT | Mod: CPTII,S$GLB,, | Performed by: FAMILY MEDICINE

## 2022-06-16 PROCEDURE — 4010F PR ACE/ARB THEARPY RXD/TAKEN: ICD-10-PCS | Mod: CPTII,S$GLB,, | Performed by: FAMILY MEDICINE

## 2022-06-16 PROCEDURE — 3066F NEPHROPATHY DOC TX: CPT | Mod: CPTII,S$GLB,, | Performed by: FAMILY MEDICINE

## 2022-06-16 PROCEDURE — 3008F PR BODY MASS INDEX (BMI) DOCUMENTED: ICD-10-PCS | Mod: CPTII,S$GLB,, | Performed by: FAMILY MEDICINE

## 2022-06-16 PROCEDURE — 3061F PR NEG MICROALBUMINURIA RESULT DOCUMENTED/REVIEW: ICD-10-PCS | Mod: CPTII,S$GLB,, | Performed by: FAMILY MEDICINE

## 2022-06-16 PROCEDURE — 3044F PR MOST RECENT HEMOGLOBIN A1C LEVEL <7.0%: ICD-10-PCS | Mod: CPTII,S$GLB,, | Performed by: FAMILY MEDICINE

## 2022-06-16 PROCEDURE — 3075F PR MOST RECENT SYSTOLIC BLOOD PRESS GE 130-139MM HG: ICD-10-PCS | Mod: CPTII,S$GLB,, | Performed by: FAMILY MEDICINE

## 2022-06-16 PROCEDURE — 1159F PR MEDICATION LIST DOCUMENTED IN MEDICAL RECORD: ICD-10-PCS | Mod: CPTII,S$GLB,, | Performed by: FAMILY MEDICINE

## 2022-06-16 PROCEDURE — 3288F FALL RISK ASSESSMENT DOCD: CPT | Mod: CPTII,S$GLB,, | Performed by: FAMILY MEDICINE

## 2022-06-16 PROCEDURE — 3075F SYST BP GE 130 - 139MM HG: CPT | Mod: CPTII,S$GLB,, | Performed by: FAMILY MEDICINE

## 2022-06-16 PROCEDURE — 99213 OFFICE O/P EST LOW 20 MIN: CPT | Mod: 25,S$GLB,, | Performed by: FAMILY MEDICINE

## 2022-06-16 PROCEDURE — 3288F PR FALLS RISK ASSESSMENT DOCUMENTED: ICD-10-PCS | Mod: CPTII,S$GLB,, | Performed by: FAMILY MEDICINE

## 2022-06-16 PROCEDURE — 96372 PR INJECTION,THERAP/PROPH/DIAG2ST, IM OR SUBCUT: ICD-10-PCS | Mod: S$GLB,,, | Performed by: FAMILY MEDICINE

## 2022-06-16 RX ORDER — CODEINE PHOSPHATE AND GUAIFENESIN 10; 100 MG/5ML; MG/5ML
5 SOLUTION ORAL EVERY 4 HOURS PRN
Qty: 180 ML | Refills: 0 | Status: SHIPPED | OUTPATIENT
Start: 2022-06-16 | End: 2022-06-26

## 2022-06-16 RX ORDER — AZITHROMYCIN 250 MG/1
TABLET, FILM COATED ORAL
Qty: 6 TABLET | Refills: 0 | Status: SHIPPED | OUTPATIENT
Start: 2022-06-16 | End: 2022-06-21

## 2022-06-16 RX ORDER — DEXAMETHASONE SODIUM PHOSPHATE 4 MG/ML
8 INJECTION, SOLUTION INTRA-ARTICULAR; INTRALESIONAL; INTRAMUSCULAR; INTRAVENOUS; SOFT TISSUE
Status: COMPLETED | OUTPATIENT
Start: 2022-06-16 | End: 2022-06-16

## 2022-06-16 RX ADMIN — DEXAMETHASONE SODIUM PHOSPHATE 8 MG: 4 INJECTION, SOLUTION INTRA-ARTICULAR; INTRALESIONAL; INTRAMUSCULAR; INTRAVENOUS; SOFT TISSUE at 04:06

## 2022-06-16 NOTE — PROGRESS NOTES
SUBJECTIVE:    Patient ID: Jacob Lou is a 66 y.o. male.    Chief Complaint: Sinus Problem (Since one week ago // tested for C-19  negative // cough // fatigue // abc)    66-year-old male complains of 1 week or upper respiratory symptoms.  Started with a nasal drip went into a full sinus congestion pressure in the sinuses.  Nonproductive cough the tickles quite frequently.  No  nausea vomiting.  Slight chills and some night sweats.  He does have some conjunctivitis to the left eye.  Home COVID test was negative.    JEANIE using CPAP at night.  Sinuses seem to be doing okay at night.    Walks 2 miles a day for exercise      Telephone on 01/04/2022   Component Date Value Ref Range Status    WBC 01/07/2022 6.5  3.8 - 10.8 Thousand/uL Final    RBC 01/07/2022 5.32  4.20 - 5.80 Million/uL Final    Hemoglobin 01/07/2022 17.1  13.2 - 17.1 g/dL Final    Hematocrit 01/07/2022 48.7  38.5 - 50.0 % Final    MCV 01/07/2022 91.5  80.0 - 100.0 fL Final    MCH 01/07/2022 32.1  27.0 - 33.0 pg Final    MCHC 01/07/2022 35.1  32.0 - 36.0 g/dL Final    RDW 01/07/2022 13.0  11.0 - 15.0 % Final    Platelets 01/07/2022 208  140 - 400 Thousand/uL Final    MPV 01/07/2022 10.1  7.5 - 12.5 fL Final    Neutrophils, Abs 01/07/2022 3,400  1,500 - 7,800 cells/uL Final    Lymph # 01/07/2022 2,067  850 - 3,900 cells/uL Final    Mono # 01/07/2022 722  200 - 950 cells/uL Final    Eos # 01/07/2022 260  15 - 500 cells/uL Final    Baso # 01/07/2022 52  0 - 200 cells/uL Final    Neutrophils Relative 01/07/2022 52.3  % Final    Lymph % 01/07/2022 31.8  % Final    Mono % 01/07/2022 11.1  % Final    Eosinophil % 01/07/2022 4.0  % Final    Basophil % 01/07/2022 0.8  % Final    Glucose 01/07/2022 129 (A) 65 - 99 mg/dL Final    BUN 01/07/2022 13  7 - 25 mg/dL Final    Creatinine 01/07/2022 1.04  0.70 - 1.25 mg/dL Final    eGFR if non  01/07/2022 73  > OR = 60 mL/min/1.73m2 Final    eGFR if  01/07/2022  85  > OR = 60 mL/min/1.73m2 Final    BUN/Creatinine Ratio 01/07/2022 NOT APPLICABLE  6 - 22 (calc) Final    Sodium 01/07/2022 140  135 - 146 mmol/L Final    Potassium 01/07/2022 4.6  3.5 - 5.3 mmol/L Final    Chloride 01/07/2022 101  98 - 110 mmol/L Final    CO2 01/07/2022 28  20 - 32 mmol/L Final    Calcium 01/07/2022 9.5  8.6 - 10.3 mg/dL Final    Total Protein 01/07/2022 7.7  6.1 - 8.1 g/dL Final    Albumin 01/07/2022 4.6  3.6 - 5.1 g/dL Final    Globulin, Total 01/07/2022 3.1  1.9 - 3.7 g/dL (calc) Final    Albumin/Globulin Ratio 01/07/2022 1.5  1.0 - 2.5 (calc) Final    Total Bilirubin 01/07/2022 0.6  0.2 - 1.2 mg/dL Final    Alkaline Phosphatase 01/07/2022 53  35 - 144 U/L Final    AST 01/07/2022 35  10 - 35 U/L Final    ALT 01/07/2022 52 (A) 9 - 46 U/L Final    Cholesterol 01/07/2022 143  <200 mg/dL Final    HDL 01/07/2022 44  > OR = 40 mg/dL Final    Triglycerides 01/07/2022 206 (A) <150 mg/dL Final    LDL Cholesterol 01/07/2022 72  mg/dL (calc) Final    HDL/Cholesterol Ratio 01/07/2022 3.3  <5.0 (calc) Final    Non HDL Chol. (LDL+VLDL) 01/07/2022 99  <130 mg/dL (calc) Final    Creatinine, Urine 01/07/2022 179  20 - 320 mg/dL Final    Microalb, Ur 01/07/2022 0.8  See Note: mg/dL Final    Microalb/Creat Ratio 01/07/2022 4  <30 mcg/mg creat Final    TSH 01/07/2022 1.78  0.40 - 4.50 mIU/L Final    Color, UA 01/07/2022 YELLOW  YELLOW Final    Appearance, UA 01/07/2022 CLEAR  CLEAR Final    Specific [place], UA 01/07/2022 1.026  1.001 - 1.035 Final    pH, UA 01/07/2022 5.5  5.0 - 8.0 Final    Glucose, UA 01/07/2022 3+ (A) NEGATIVE Final    Bilirubin, UA 01/07/2022 NEGATIVE  NEGATIVE Final    Ketones, UA 01/07/2022 NEGATIVE  NEGATIVE Final    Occult Blood UA 01/07/2022 NEGATIVE  NEGATIVE Final    Protein, UA 01/07/2022 NEGATIVE  NEGATIVE Final    Nitrite, UA 01/07/2022 NEGATIVE  NEGATIVE Final    Leukocytes, UA 01/07/2022 NEGATIVE  NEGATIVE Final    WBC Casts, UA 01/07/2022 NONE  SEEN  < OR = 5 /HPF Final    RBC Casts, UA 01/07/2022 NONE SEEN  < OR = 2 /HPF Final    Squam Epithel, UA 01/07/2022 NONE SEEN  < OR = 5 /HPF Final    Bacteria, UA 01/07/2022 NONE SEEN  NONE SEEN /HPF Final    Hyaline Casts, UA 01/07/2022 NONE SEEN  NONE SEEN /LPF Final    Reflexive Urine Culture 01/07/2022    Final    Hemoglobin A1C 01/07/2022 6.6 (A) <5.7 % of total Hgb Final    PROSTATE SPECIFIC ANTIGEN, SCR - Q* 01/07/2022 0.52  < OR = 4.00 ng/mL Final       Past Medical History:   Diagnosis Date    GERD (gastroesophageal reflux disease)     Hyperlipidemia     Squamous cell carcinoma of skin      Social History     Socioeconomic History    Marital status:    Tobacco Use    Smoking status: Never Smoker    Smokeless tobacco: Never Used   Substance and Sexual Activity    Alcohol use: Yes     Alcohol/week: 1.0 standard drink     Types: 1 Glasses of wine per week    Drug use: No    Sexual activity: Yes     Partners: Female     Social Determinants of Health     Financial Resource Strain: Unknown    Difficulty of Paying Living Expenses: Patient refused   Food Insecurity: Unknown    Worried About Running Out of Food in the Last Year: Patient refused    Ran Out of Food in the Last Year: Patient refused   Transportation Needs: Unknown    Lack of Transportation (Medical): Patient refused    Lack of Transportation (Non-Medical): Patient refused   Physical Activity: Sufficiently Active    Days of Exercise per Week: 3 days    Minutes of Exercise per Session: 60 min   Stress: No Stress Concern Present    Feeling of Stress : Only a little   Social Connections: Unknown    Frequency of Communication with Friends and Family: More than three times a week    Frequency of Social Gatherings with Friends and Family: Twice a week    Active Member of Clubs or Organizations: Yes    Attends Club or Organization Meetings: More than 4 times per year    Marital Status:    Housing Stability: Unknown     Unable to Pay for Housing in the Last Year: Patient refused    Unstable Housing in the Last Year: Patient refused     Past Surgical History:   Procedure Laterality Date    cataracts  2019    Dr MARYANN Martinez    COLONOSCOPY N/A 11/30/2017    Procedure: COLONOSCOPY;  Surgeon: Maikel Medina MD;  Location: [facility];  Service: Endoscopy;  Laterality: N/A;    CORONARY ARTERY BYPASS GRAFT  11/02/2020    Dr Mims- 3 vessel    EYE SURGERY  April 2019    FOOT SURGERY      HERNIA REPAIR       Family History   Problem Relation Age of Onset    Diabetes Mother     Hypertension Father        Review of patient's allergies indicates:  No Known Allergies    Current Outpatient Medications:     ascorbic acid, vitamin C, 500 mg Cap, ascorbic acid (vitamin C) 500 mg capsule  Take 2 capsules every day by oral route., Disp: , Rfl:     aspirin (ECOTRIN) 81 MG EC tablet, Take 81 mg by mouth once daily., Disp: , Rfl:     blood sugar diagnostic Strp, To check BG 1 times daily, to use with insurance preferred meter, Disp: 100 strip, Rfl: 0    coenzyme Q10 100 mg capsule, Co Q-10 100 mg capsule  Take 1 capsule every day by oral route., Disp: , Rfl:     dapagliflozin (FARXIGA) 5 mg Tab tablet, Take 1 tablet (5 mg total) by mouth once daily., Disp: 90 tablet, Rfl: 1    ibuprofen (ADVIL,MOTRIN) 200 MG tablet, Take 200 mg by mouth every 8 (eight) hours as needed for Pain., Disp: , Rfl:     KRILL OIL ORAL, Take by mouth once daily., Disp: , Rfl:     lancets Misc, To check BG 1 times daily, to use with insurance preferred meter, Disp: 100 each, Rfl: 1    losartan (COZAAR) 50 MG tablet, Take 1 tablet (50 mg total) by mouth once daily., Disp: 90 tablet, Rfl: 1    metFORMIN (GLUCOPHAGE-XR) 750 MG ER 24hr tablet, Take 1 tablet (750 mg total) by mouth 2 (two) times daily with meals., Disp: 180 tablet, Rfl: 1    omeprazole (PRILOSEC) 20 MG capsule, Take 1 capsule (20 mg total) by mouth every other day., Disp: 45 capsule, Rfl: 1     rosuvastatin (CRESTOR) 20 MG tablet, Take 1 tablet (20 mg total) by mouth once daily., Disp: 90 tablet, Rfl: 2    azithromycin (Z-TIFFANY) 250 MG tablet, Take 2 tablets by mouth on day 1; Take 1 tablet by mouth on days 2-5, Disp: 6 tablet, Rfl: 0    blood-glucose meter kit, To check BG 1 times daily, to use with insurance preferred meter, Disp: 1 each, Rfl: 0    guaiFENesin-codeine 100-10 mg/5 ml (TUSSI-ORGANIDIN NR)  mg/5 mL syrup, Take 5 mLs by mouth every 4 (four) hours as needed for Cough., Disp: 180 mL, Rfl: 0  No current facility-administered medications for this visit.    Review of Systems   Constitutional: Positive for chills, fatigue and fever. Negative for appetite change and unexpected weight change.   HENT: Negative for congestion, ear pain and trouble swallowing.    Eyes: Positive for redness. Negative for pain, discharge and visual disturbance.   Respiratory: Positive for cough (dry nonproductive cough). Negative for apnea, shortness of breath and wheezing.    Cardiovascular: Negative for chest pain and leg swelling.   Gastrointestinal: Negative for abdominal pain, blood in stool, constipation, diarrhea, nausea and vomiting.   Endocrine: Negative for cold intolerance, heat intolerance and polydipsia.   Genitourinary: Negative for dysuria, hematuria, testicular pain and urgency.   Musculoskeletal: Negative for gait problem, joint swelling and myalgias.   Neurological: Positive for headaches. Negative for dizziness, seizures and numbness.   Psychiatric/Behavioral: Negative for agitation, behavioral problems and hallucinations. The patient is not nervous/anxious.           Objective:      Vitals:    06/16/22 1536   BP: 138/80   Pulse: 78   Weight: 104.3 kg (230 lb)   Height: 6' (1.829 m)     Physical Exam  Vitals and nursing note reviewed.   Constitutional:       General: He is not in acute distress.     Appearance: Normal appearance. He is well-developed. He is obese. He is ill-appearing. He is not  toxic-appearing.   HENT:      Head: Normocephalic and atraumatic.      Right Ear: Tympanic membrane and external ear normal.      Left Ear: Tympanic membrane and external ear normal.      Nose: Congestion present.      Mouth/Throat:      Mouth: Mucous membranes are moist.      Pharynx: Posterior oropharyngeal erythema present.   Eyes:      Pupils: Pupils are equal, round, and reactive to light.   Neck:      Thyroid: No thyromegaly.      Vascular: No carotid bruit.   Cardiovascular:      Rate and Rhythm: Normal rate and regular rhythm.      Heart sounds: Normal heart sounds. No murmur heard.  Pulmonary:      Effort: Pulmonary effort is normal.      Breath sounds: Wheezing (Minimal scattered expiratory wheeze) present. No rales.   Abdominal:      General: Bowel sounds are normal. There is no distension.      Palpations: Abdomen is soft.      Tenderness: There is no abdominal tenderness.   Musculoskeletal:         General: No tenderness or deformity. Normal range of motion.      Cervical back: Normal range of motion and neck supple.      Lumbar back: Normal. No spasms.      Comments: Bends 90 degrees at  waist   Lymphadenopathy:      Cervical: No cervical adenopathy.   Skin:     General: Skin is warm and dry.      Findings: No rash.   Neurological:      Mental Status: He is alert and oriented to person, place, and time.      Cranial Nerves: No cranial nerve deficit.      Coordination: Coordination normal.   Psychiatric:         Behavior: Behavior normal.         Thought Content: Thought content normal.         Judgment: Judgment normal.           Assessment:       1. Acute non-recurrent pansinusitis    2. Acute bronchitis, unspecified organism    3. Type 2 diabetes mellitus without complication, without long-term current use of insulin    4. Status post three vessel coronary artery bypass    5. Mixed hyperlipidemia         Plan:       Acute non-recurrent pansinusitis  -     azithromycin (Z-TIFFANY) 250 MG tablet; Take 2  tablets by mouth on day 1; Take 1 tablet by mouth on days 2-5  Dispense: 6 tablet; Refill: 0  Will start with azithromycin  Acute bronchitis, unspecified organism  -     dexamethasone injection 8 mg  -     guaiFENesin-codeine 100-10 mg/5 ml (TUSSI-ORGANIDIN NR)  mg/5 mL syrup; Take 5 mLs by mouth every 4 (four) hours as needed for Cough.  Dispense: 180 mL; Refill: 0  Decadron injection 8 mg followed by Cheratussin cough syrup  Type 2 diabetes mellitus without complication, without long-term current use of insulin    Status post three vessel coronary artery bypass    Mixed hyperlipidemia      No follow-ups on file.        6/17/2022 Tyler Abel

## 2022-07-21 ENCOUNTER — TELEPHONE (OUTPATIENT)
Dept: FAMILY MEDICINE | Facility: CLINIC | Age: 67
End: 2022-07-21

## 2022-07-30 LAB
ALBUMIN SERPL-MCNC: 4.3 G/DL (ref 3.6–5.1)
ALBUMIN/GLOB SERPL: 1.5 (CALC) (ref 1–2.5)
ALP SERPL-CCNC: 48 U/L (ref 35–144)
ALT SERPL-CCNC: 25 U/L (ref 9–46)
AST SERPL-CCNC: 20 U/L (ref 10–35)
BILIRUB SERPL-MCNC: 0.6 MG/DL (ref 0.2–1.2)
BUN SERPL-MCNC: 12 MG/DL (ref 7–25)
BUN/CREAT SERPL: ABNORMAL (CALC) (ref 6–22)
CALCIUM SERPL-MCNC: 9.4 MG/DL (ref 8.6–10.3)
CHLORIDE SERPL-SCNC: 102 MMOL/L (ref 98–110)
CHOLEST SERPL-MCNC: 149 MG/DL
CHOLEST/HDLC SERPL: 3 (CALC)
CO2 SERPL-SCNC: 28 MMOL/L (ref 20–32)
CREAT SERPL-MCNC: 0.88 MG/DL (ref 0.7–1.35)
EGFR: 94 ML/MIN/1.73M2
GLOBULIN SER CALC-MCNC: 2.9 G/DL (CALC) (ref 1.9–3.7)
GLUCOSE SERPL-MCNC: 135 MG/DL (ref 65–99)
HBA1C MFR BLD: 6.3 % OF TOTAL HGB
HDLC SERPL-MCNC: 50 MG/DL
LDLC SERPL CALC-MCNC: 73 MG/DL (CALC)
NONHDLC SERPL-MCNC: 99 MG/DL (CALC)
POTASSIUM SERPL-SCNC: 4.2 MMOL/L (ref 3.5–5.3)
PROT SERPL-MCNC: 7.2 G/DL (ref 6.1–8.1)
SODIUM SERPL-SCNC: 139 MMOL/L (ref 135–146)
TRIGL SERPL-MCNC: 184 MG/DL

## 2022-08-08 ENCOUNTER — OFFICE VISIT (OUTPATIENT)
Dept: FAMILY MEDICINE | Facility: CLINIC | Age: 67
End: 2022-08-08
Payer: MEDICARE

## 2022-08-08 VITALS
HEIGHT: 72 IN | HEART RATE: 74 BPM | SYSTOLIC BLOOD PRESSURE: 118 MMHG | DIASTOLIC BLOOD PRESSURE: 68 MMHG | BODY MASS INDEX: 31.02 KG/M2 | WEIGHT: 229 LBS

## 2022-08-08 DIAGNOSIS — I10 BENIGN ESSENTIAL HTN: ICD-10-CM

## 2022-08-08 DIAGNOSIS — E11.9 TYPE 2 DIABETES MELLITUS WITHOUT COMPLICATION, WITHOUT LONG-TERM CURRENT USE OF INSULIN: ICD-10-CM

## 2022-08-08 DIAGNOSIS — Z82.49 FAMILY HISTORY OF CORONARY ARTERY DISEASE: ICD-10-CM

## 2022-08-08 DIAGNOSIS — N52.01 ERECTILE DYSFUNCTION DUE TO ARTERIAL INSUFFICIENCY: ICD-10-CM

## 2022-08-08 DIAGNOSIS — Z95.1 STATUS POST THREE VESSEL CORONARY ARTERY BYPASS: ICD-10-CM

## 2022-08-08 DIAGNOSIS — E11.9 TYPE 2 DIABETES MELLITUS WITHOUT COMPLICATION, WITHOUT LONG-TERM CURRENT USE OF INSULIN: Primary | ICD-10-CM

## 2022-08-08 DIAGNOSIS — K21.9 GASTROESOPHAGEAL REFLUX DISEASE WITHOUT ESOPHAGITIS: ICD-10-CM

## 2022-08-08 DIAGNOSIS — E78.2 MIXED HYPERLIPIDEMIA: ICD-10-CM

## 2022-08-08 PROCEDURE — 3061F PR NEG MICROALBUMINURIA RESULT DOCUMENTED/REVIEW: ICD-10-PCS | Mod: CPTII,S$GLB,, | Performed by: FAMILY MEDICINE

## 2022-08-08 PROCEDURE — 3288F PR FALLS RISK ASSESSMENT DOCUMENTED: ICD-10-PCS | Mod: CPTII,S$GLB,, | Performed by: FAMILY MEDICINE

## 2022-08-08 PROCEDURE — 3074F PR MOST RECENT SYSTOLIC BLOOD PRESSURE < 130 MM HG: ICD-10-PCS | Mod: CPTII,S$GLB,, | Performed by: FAMILY MEDICINE

## 2022-08-08 PROCEDURE — 3008F PR BODY MASS INDEX (BMI) DOCUMENTED: ICD-10-PCS | Mod: CPTII,S$GLB,, | Performed by: FAMILY MEDICINE

## 2022-08-08 PROCEDURE — 4010F ACE/ARB THERAPY RXD/TAKEN: CPT | Mod: CPTII,S$GLB,, | Performed by: FAMILY MEDICINE

## 2022-08-08 PROCEDURE — 3066F NEPHROPATHY DOC TX: CPT | Mod: CPTII,S$GLB,, | Performed by: FAMILY MEDICINE

## 2022-08-08 PROCEDURE — 1159F MED LIST DOCD IN RCRD: CPT | Mod: CPTII,S$GLB,, | Performed by: FAMILY MEDICINE

## 2022-08-08 PROCEDURE — 3066F PR DOCUMENTATION OF TREATMENT FOR NEPHROPATHY: ICD-10-PCS | Mod: CPTII,S$GLB,, | Performed by: FAMILY MEDICINE

## 2022-08-08 PROCEDURE — 1101F PT FALLS ASSESS-DOCD LE1/YR: CPT | Mod: CPTII,S$GLB,, | Performed by: FAMILY MEDICINE

## 2022-08-08 PROCEDURE — 99214 OFFICE O/P EST MOD 30 MIN: CPT | Mod: S$GLB,,, | Performed by: FAMILY MEDICINE

## 2022-08-08 PROCEDURE — 4010F PR ACE/ARB THEARPY RXD/TAKEN: ICD-10-PCS | Mod: CPTII,S$GLB,, | Performed by: FAMILY MEDICINE

## 2022-08-08 PROCEDURE — 3078F DIAST BP <80 MM HG: CPT | Mod: CPTII,S$GLB,, | Performed by: FAMILY MEDICINE

## 2022-08-08 PROCEDURE — 3061F NEG MICROALBUMINURIA REV: CPT | Mod: CPTII,S$GLB,, | Performed by: FAMILY MEDICINE

## 2022-08-08 PROCEDURE — 1101F PR PT FALLS ASSESS DOC 0-1 FALLS W/OUT INJ PAST YR: ICD-10-PCS | Mod: CPTII,S$GLB,, | Performed by: FAMILY MEDICINE

## 2022-08-08 PROCEDURE — 99214 PR OFFICE/OUTPT VISIT, EST, LEVL IV, 30-39 MIN: ICD-10-PCS | Mod: S$GLB,,, | Performed by: FAMILY MEDICINE

## 2022-08-08 PROCEDURE — 3078F PR MOST RECENT DIASTOLIC BLOOD PRESSURE < 80 MM HG: ICD-10-PCS | Mod: CPTII,S$GLB,, | Performed by: FAMILY MEDICINE

## 2022-08-08 PROCEDURE — 1159F PR MEDICATION LIST DOCUMENTED IN MEDICAL RECORD: ICD-10-PCS | Mod: CPTII,S$GLB,, | Performed by: FAMILY MEDICINE

## 2022-08-08 PROCEDURE — 3074F SYST BP LT 130 MM HG: CPT | Mod: CPTII,S$GLB,, | Performed by: FAMILY MEDICINE

## 2022-08-08 PROCEDURE — 3044F PR MOST RECENT HEMOGLOBIN A1C LEVEL <7.0%: ICD-10-PCS | Mod: CPTII,S$GLB,, | Performed by: FAMILY MEDICINE

## 2022-08-08 PROCEDURE — 3288F FALL RISK ASSESSMENT DOCD: CPT | Mod: CPTII,S$GLB,, | Performed by: FAMILY MEDICINE

## 2022-08-08 PROCEDURE — 3008F BODY MASS INDEX DOCD: CPT | Mod: CPTII,S$GLB,, | Performed by: FAMILY MEDICINE

## 2022-08-08 PROCEDURE — 3044F HG A1C LEVEL LT 7.0%: CPT | Mod: CPTII,S$GLB,, | Performed by: FAMILY MEDICINE

## 2022-08-08 RX ORDER — DAPAGLIFLOZIN 5 MG/1
5 TABLET, FILM COATED ORAL DAILY
Qty: 90 TABLET | Refills: 1 | Status: SHIPPED | OUTPATIENT
Start: 2022-08-08 | End: 2023-02-08 | Stop reason: SDUPTHER

## 2022-08-08 RX ORDER — LOSARTAN POTASSIUM 50 MG/1
50 TABLET ORAL DAILY
Qty: 90 TABLET | Refills: 1 | Status: SHIPPED | OUTPATIENT
Start: 2022-08-08 | End: 2023-02-08 | Stop reason: SDUPTHER

## 2022-08-08 RX ORDER — METFORMIN HYDROCHLORIDE 750 MG/1
750 TABLET, EXTENDED RELEASE ORAL 2 TIMES DAILY WITH MEALS
Qty: 180 TABLET | Refills: 1 | Status: SHIPPED | OUTPATIENT
Start: 2022-08-08 | End: 2023-02-08 | Stop reason: SDUPTHER

## 2022-08-08 RX ORDER — ROSUVASTATIN CALCIUM 20 MG/1
20 TABLET, COATED ORAL DAILY
Qty: 90 TABLET | Refills: 2 | Status: SHIPPED | OUTPATIENT
Start: 2022-08-08 | End: 2023-10-04 | Stop reason: SDUPTHER

## 2022-08-08 RX ORDER — OMEPRAZOLE 20 MG/1
20 CAPSULE, DELAYED RELEASE ORAL EVERY OTHER DAY
Qty: 45 CAPSULE | Refills: 1 | Status: SHIPPED | OUTPATIENT
Start: 2022-08-08 | End: 2023-01-25 | Stop reason: SDUPTHER

## 2022-08-08 NOTE — PROGRESS NOTES
SUBJECTIVE:    Patient ID: Jacob Lou is a 66 y.o. male.    Chief Complaint: Diabetes (No bottles // upcoming Eye appt // need refill // Lab-results //abc )    66-year-old male very active in yd work and walks 2 miles per day.  No chest pains noted. history of 3 vessel CABG in  2020.  Currently follows up with cardiologist Dr. Liz sheikh6 months.    2017-colonoscopy with Dr. Medina-[facility] 5 years    Diabetes type 2-eating smaller portions and has lost 7 lb this year.  A1c is down 6.3.  Controlled with oral medications only.      No visits with results within 6 Month(s) from this visit.   Latest known visit with results is:   Office Visit on 01/27/2022   Component Date Value Ref Range Status    Glucose 07/29/2022 135 (A) 65 - 99 mg/dL Final    BUN 07/29/2022 12  7 - 25 mg/dL Final    Creatinine 07/29/2022 0.88  0.70 - 1.35 mg/dL Final    EGFR 07/29/2022 94  > OR = 60 mL/min/1.73m2 Final    BUN/Creatinine Ratio 07/29/2022 NOT APPLICABLE  6 - 22 (calc) Final    Sodium 07/29/2022 139  135 - 146 mmol/L Final    Potassium 07/29/2022 4.2  3.5 - 5.3 mmol/L Final    Chloride 07/29/2022 102  98 - 110 mmol/L Final    CO2 07/29/2022 28  20 - 32 mmol/L Final    Calcium 07/29/2022 9.4  8.6 - 10.3 mg/dL Final    Total Protein 07/29/2022 7.2  6.1 - 8.1 g/dL Final    Albumin 07/29/2022 4.3  3.6 - 5.1 g/dL Final    Globulin, Total 07/29/2022 2.9  1.9 - 3.7 g/dL (calc) Final    Albumin/Globulin Ratio 07/29/2022 1.5  1.0 - 2.5 (calc) Final    Total Bilirubin 07/29/2022 0.6  0.2 - 1.2 mg/dL Final    Alkaline Phosphatase 07/29/2022 48  35 - 144 U/L Final    AST 07/29/2022 20  10 - 35 U/L Final    ALT 07/29/2022 25  9 - 46 U/L Final    Hemoglobin A1C 07/29/2022 6.3 (A) <5.7 % of total Hgb Final    Cholesterol 07/29/2022 149  <200 mg/dL Final    HDL 07/29/2022 50  > OR = 40 mg/dL Final    Triglycerides 07/29/2022 184 (A) <150 mg/dL Final    LDL Cholesterol 07/29/2022 73  mg/dL (calc) Final    HDL/Cholesterol Ratio  07/29/2022 3.0  <5.0 (calc) Final    Non HDL Chol. (LDL+VLDL) 07/29/2022 99  <130 mg/dL (calc) Final       Past Medical History:   Diagnosis Date    GERD (gastroesophageal reflux disease)     Hyperlipidemia     Squamous cell carcinoma of skin      Social History     Socioeconomic History    Marital status:    Tobacco Use    Smoking status: Never Smoker    Smokeless tobacco: Never Used   Substance and Sexual Activity    Alcohol use: Yes     Alcohol/week: 1.0 standard drink     Types: 1 Glasses of wine per week    Drug use: No    Sexual activity: Yes     Partners: Female     Social Determinants of Health     Financial Resource Strain: Unknown    Difficulty of Paying Living Expenses: Patient refused   Food Insecurity: Unknown    Worried About Running Out of Food in the Last Year: Patient refused    Ran Out of Food in the Last Year: Patient refused   Transportation Needs: Unknown    Lack of Transportation (Medical): Patient refused    Lack of Transportation (Non-Medical): Patient refused   Physical Activity: Sufficiently Active    Days of Exercise per Week: 3 days    Minutes of Exercise per Session: 60 min   Stress: Unknown    Feeling of Stress : Patient refused   Social Connections: Unknown    Frequency of Communication with Friends and Family: More than three times a week    Frequency of Social Gatherings with Friends and Family: Patient refused    Active Member of Clubs or Organizations: Patient refused    Attends Club or Organization Meetings: Patient refused    Marital Status: Patient refused   Housing Stability: Unknown    Unable to Pay for Housing in the Last Year: Patient refused    Unstable Housing in the Last Year: Patient refused     Past Surgical History:   Procedure Laterality Date    cataracts  2019    Dr MARYANN Martinez    COLONOSCOPY N/A 11/30/2017    Procedure: COLONOSCOPY;  Surgeon: Maikel Medina MD;  Location: [facility];  Service: Endoscopy;  Laterality: N/A;    CORONARY  ARTERY BYPASS GRAFT  11/02/2020    Dr Mims- 3 vessel    EYE SURGERY  April 2019    FOOT SURGERY      HERNIA REPAIR       Family History   Problem Relation Age of Onset    Diabetes Mother     Hypertension Father        Review of patient's allergies indicates:  No Known Allergies    Current Outpatient Medications:     ascorbic acid, vitamin C, 500 mg Cap, ascorbic acid (vitamin C) 500 mg capsule  Take 2 capsules every day by oral route., Disp: , Rfl:     aspirin (ECOTRIN) 81 MG EC tablet, Take 81 mg by mouth once daily., Disp: , Rfl:     blood sugar diagnostic Strp, To check BG 1 times daily, to use with insurance preferred meter, Disp: 100 strip, Rfl: 0    coenzyme Q10 100 mg capsule, Co Q-10 100 mg capsule  Take 1 capsule every day by oral route., Disp: , Rfl:     ibuprofen (ADVIL,MOTRIN) 200 MG tablet, Take 200 mg by mouth every 8 (eight) hours as needed for Pain., Disp: , Rfl:     KRILL OIL ORAL, Take by mouth once daily., Disp: , Rfl:     lancets Misc, To check BG 1 times daily, to use with insurance preferred meter, Disp: 100 each, Rfl: 1    blood-glucose meter kit, To check BG 1 times daily, to use with insurance preferred meter, Disp: 1 each, Rfl: 0    dapagliflozin (FARXIGA) 5 mg Tab tablet, Take 1 tablet (5 mg total) by mouth once daily., Disp: 90 tablet, Rfl: 1    losartan (COZAAR) 50 MG tablet, Take 1 tablet (50 mg total) by mouth once daily., Disp: 90 tablet, Rfl: 1    metFORMIN (GLUCOPHAGE-XR) 750 MG ER 24hr tablet, Take 1 tablet (750 mg total) by mouth 2 (two) times daily with meals., Disp: 180 tablet, Rfl: 1    omeprazole (PRILOSEC) 20 MG capsule, Take 1 capsule (20 mg total) by mouth every other day., Disp: 45 capsule, Rfl: 1    rosuvastatin (CRESTOR) 20 MG tablet, Take 1 tablet (20 mg total) by mouth once daily., Disp: 90 tablet, Rfl: 2    Review of Systems   Constitutional: Negative for appetite change, chills, fatigue, fever and unexpected weight change.   HENT: Negative for  congestion, ear pain and trouble swallowing.    Eyes: Negative for pain, discharge and visual disturbance.   Respiratory: Negative for apnea, cough, shortness of breath and wheezing.    Cardiovascular: Negative for chest pain and leg swelling.   Gastrointestinal: Negative for abdominal pain, blood in stool, constipation, diarrhea, nausea and vomiting.   Endocrine: Negative for cold intolerance, heat intolerance and polydipsia.   Genitourinary: Negative for dysuria, hematuria, testicular pain and urgency.        Nocturia 0-1 x  night   Musculoskeletal: Positive for back pain (improved w/ new mattress). Negative for gait problem, joint swelling and myalgias.   Neurological: Negative for dizziness, seizures and numbness.   Psychiatric/Behavioral: Negative for agitation, behavioral problems and hallucinations. The patient is not nervous/anxious.           Objective:      Vitals:    08/08/22 1028   BP: 118/68   Pulse: 74   Weight: 103.9 kg (229 lb)   Height: 6' (1.829 m)     Physical Exam  Vitals and nursing note reviewed.   Constitutional:       General: He is not in acute distress.     Appearance: He is well-developed. He is obese. He is not toxic-appearing.   HENT:      Head: Normocephalic and atraumatic.      Right Ear: Tympanic membrane and external ear normal.      Left Ear: Tympanic membrane and external ear normal.      Nose: Nose normal.      Mouth/Throat:      Pharynx: Oropharynx is clear.   Eyes:      Pupils: Pupils are equal, round, and reactive to light.   Neck:      Thyroid: No thyromegaly.      Vascular: No carotid bruit.   Cardiovascular:      Rate and Rhythm: Normal rate and regular rhythm.      Heart sounds: Normal heart sounds. No murmur heard.  Pulmonary:      Effort: Pulmonary effort is normal.      Breath sounds: Normal breath sounds. No wheezing or rales.   Abdominal:      General: Bowel sounds are normal. There is no distension.      Palpations: Abdomen is soft.      Tenderness: There is no  abdominal tenderness.   Musculoskeletal:         General: No tenderness or deformity. Normal range of motion.      Cervical back: Normal range of motion and neck supple.      Lumbar back: Normal. No spasms.      Comments: Bends 90 degrees at  waist in shoulders have good range of motion knees are mildly crepitant.  No pitting edema to lower extremities.   Lymphadenopathy:      Cervical: No cervical adenopathy.   Skin:     General: Skin is warm and dry.      Findings: No rash.   Neurological:      Mental Status: He is alert and oriented to person, place, and time. Mental status is at baseline.      Cranial Nerves: No cranial nerve deficit.      Coordination: Coordination normal.   Psychiatric:         Mood and Affect: Mood normal.         Behavior: Behavior normal.         Thought Content: Thought content normal.         Judgment: Judgment normal.           Assessment:       1. Type 2 diabetes mellitus without complication, without long-term current use of insulin    2. Benign essential HTN    3. Type 2 diabetes mellitus without complication, without long-term current use of insulin    4. Gastroesophageal reflux disease without esophagitis    5. Mixed hyperlipidemia    6. Status post three vessel coronary artery bypass    7. Erectile dysfunction due to arterial insufficiency    8. Family history of coronary artery disease         Plan:       Type 2 diabetes mellitus without complication, without long-term current use of insulin  -     dapagliflozin (FARXIGA) 5 mg Tab tablet; Take 1 tablet (5 mg total) by mouth once daily.  Dispense: 90 tablet; Refill: 1  -     metFORMIN (GLUCOPHAGE-XR) 750 MG ER 24hr tablet; Take 1 tablet (750 mg total) by mouth 2 (two) times daily with meals.  Dispense: 180 tablet; Refill: 1  A1c is 6.3 showing adequately controlled diabetes.  Continue current medications  Benign essential HTN  -     losartan (COZAAR) 50 MG tablet; Take 1 tablet (50 mg total) by mouth once daily.  Dispense: 90  tablet; Refill: 1    Type 2 diabetes mellitus without complication, without long-term current use of insulin  Comments:  Greatly improved to 6.3% today. NO changes  Orders:  -     dapagliflozin (FARXIGA) 5 mg Tab tablet; Take 1 tablet (5 mg total) by mouth once daily.  Dispense: 90 tablet; Refill: 1  -     metFORMIN (GLUCOPHAGE-XR) 750 MG ER 24hr tablet; Take 1 tablet (750 mg total) by mouth 2 (two) times daily with meals.  Dispense: 180 tablet; Refill: 1    Gastroesophageal reflux disease without esophagitis  Comments:  Doing well with omeprazole. continue as is.  Orders:  -     omeprazole (PRILOSEC) 20 MG capsule; Take 1 capsule (20 mg total) by mouth every other day.  Dispense: 45 capsule; Refill: 1    Mixed hyperlipidemia  -     rosuvastatin (CRESTOR) 20 MG tablet; Take 1 tablet (20 mg total) by mouth once daily.  Dispense: 90 tablet; Refill: 2   Cholesterol improved to 149, triglycerides 184, continue current rosuvastatin  Status post three vessel coronary artery bypass  Stable at this time  Erectile dysfunction due to arterial insufficiency    Family history of coronary artery disease      Follow up in about 6 months (around 2/8/2023), or CAD.        8/8/2022 Tyler Abel

## 2022-11-22 RX ORDER — CIPROFLOXACIN 500 MG/1
500 TABLET ORAL 2 TIMES DAILY
Qty: 14 TABLET | Refills: 0 | Status: SHIPPED | OUTPATIENT
Start: 2022-11-22 | End: 2023-02-08

## 2022-11-22 NOTE — TELEPHONE ENCOUNTER
----- Message from Flora Grace MA sent at 11/22/2022  3:11 PM CST -----  Regarding: diarrhea  Complains of diarrhea since Sunday morning patient has been taking lomotil qd for 2 days . Needs to know what else can be done.  Jack sutton  [phone]

## 2022-11-22 NOTE — TELEPHONE ENCOUNTER
Spoke with pt stated he having diarrhea with gas and has no appetite. Pt also states he may have eat bad food on Sunday but the issue has yet to resolve. Told pt there was a stomach virus going around.

## 2022-11-22 NOTE — TELEPHONE ENCOUNTER
"Per Dr. Abel "can have Cipro 500mg 1 po BID #14 Take Lomotil 1 po q4hour. Stay on soups and liquid diet." Spoke with pt and let him know.   "

## 2023-01-25 ENCOUNTER — TELEPHONE (OUTPATIENT)
Dept: FAMILY MEDICINE | Facility: CLINIC | Age: 68
End: 2023-01-25

## 2023-01-25 DIAGNOSIS — K21.9 GASTROESOPHAGEAL REFLUX DISEASE WITHOUT ESOPHAGITIS: ICD-10-CM

## 2023-01-25 RX ORDER — OMEPRAZOLE 20 MG/1
20 CAPSULE, DELAYED RELEASE ORAL DAILY
Qty: 90 CAPSULE | Refills: 1 | Status: SHIPPED | OUTPATIENT
Start: 2023-01-25 | End: 2023-02-08 | Stop reason: SDUPTHER

## 2023-01-25 NOTE — TELEPHONE ENCOUNTER
----- Message from Flora Mckoy sent at 1/25/2023 12:05 PM CST -----  Pt would like to get an increase on his omeprazole. He take it every other day and would like it once daily and a 90 day supply. Please send to [facility] - [address]. [phone]

## 2023-01-25 NOTE — TELEPHONE ENCOUNTER
----- Message from Flora Mckoy sent at 1/25/2023 12:05 PM CST -----  Pt would like to get an increase on his omeprazole. He take it every other day and would like it once daily and a 90 day supply. Please send to [facility] - [address]. [phone]

## 2023-01-31 ENCOUNTER — TELEPHONE (OUTPATIENT)
Dept: FAMILY MEDICINE | Facility: CLINIC | Age: 68
End: 2023-01-31

## 2023-02-06 ENCOUNTER — TELEPHONE (OUTPATIENT)
Dept: FAMILY MEDICINE | Facility: CLINIC | Age: 68
End: 2023-02-06

## 2023-02-06 ENCOUNTER — PATIENT MESSAGE (OUTPATIENT)
Dept: FAMILY MEDICINE | Facility: CLINIC | Age: 68
End: 2023-02-06

## 2023-02-06 DIAGNOSIS — I10 BENIGN ESSENTIAL HTN: ICD-10-CM

## 2023-02-06 DIAGNOSIS — E11.9 TYPE 2 DIABETES MELLITUS WITHOUT COMPLICATION, WITHOUT LONG-TERM CURRENT USE OF INSULIN: ICD-10-CM

## 2023-02-06 DIAGNOSIS — Z12.5 SPECIAL SCREENING FOR MALIGNANT NEOPLASM OF PROSTATE: ICD-10-CM

## 2023-02-06 DIAGNOSIS — Z00.00 ROUTINE GENERAL MEDICAL EXAMINATION AT A HEALTH CARE FACILITY: Primary | ICD-10-CM

## 2023-02-06 DIAGNOSIS — E78.2 MIXED HYPERLIPIDEMIA: ICD-10-CM

## 2023-02-06 NOTE — TELEPHONE ENCOUNTER
----- Message from Flora Mckoy sent at 2/6/2023 11:07 AM CST -----  Pt would like to have his labs order placed before his appt on 02/08. [phone]

## 2023-02-08 ENCOUNTER — OFFICE VISIT (OUTPATIENT)
Dept: FAMILY MEDICINE | Facility: CLINIC | Age: 68
End: 2023-02-08
Payer: MEDICARE

## 2023-02-08 VITALS
HEART RATE: 67 BPM | BODY MASS INDEX: 31.42 KG/M2 | WEIGHT: 232 LBS | DIASTOLIC BLOOD PRESSURE: 70 MMHG | SYSTOLIC BLOOD PRESSURE: 112 MMHG | HEIGHT: 72 IN

## 2023-02-08 DIAGNOSIS — I10 BENIGN ESSENTIAL HTN: ICD-10-CM

## 2023-02-08 DIAGNOSIS — E11.9 TYPE 2 DIABETES MELLITUS WITHOUT COMPLICATION, WITHOUT LONG-TERM CURRENT USE OF INSULIN: Primary | ICD-10-CM

## 2023-02-08 DIAGNOSIS — E78.2 MIXED HYPERLIPIDEMIA: ICD-10-CM

## 2023-02-08 DIAGNOSIS — K21.9 GASTROESOPHAGEAL REFLUX DISEASE WITHOUT ESOPHAGITIS: ICD-10-CM

## 2023-02-08 DIAGNOSIS — N52.01 ERECTILE DYSFUNCTION DUE TO ARTERIAL INSUFFICIENCY: ICD-10-CM

## 2023-02-08 DIAGNOSIS — M19.071 ARTHRITIS OF RIGHT ANKLE: ICD-10-CM

## 2023-02-08 DIAGNOSIS — E11.9 TYPE 2 DIABETES MELLITUS WITHOUT COMPLICATION, WITHOUT LONG-TERM CURRENT USE OF INSULIN: ICD-10-CM

## 2023-02-08 DIAGNOSIS — Z95.1 STATUS POST THREE VESSEL CORONARY ARTERY BYPASS: ICD-10-CM

## 2023-02-08 DIAGNOSIS — Z82.49 FAMILY HISTORY OF CORONARY ARTERY DISEASE: ICD-10-CM

## 2023-02-08 LAB
ALBUMIN SERPL-MCNC: 4.5 G/DL (ref 3.6–5.1)
ALBUMIN/GLOB SERPL: 1.7 (CALC) (ref 1–2.5)
ALP SERPL-CCNC: 42 U/L (ref 35–144)
ALT SERPL-CCNC: 27 U/L (ref 9–46)
AST SERPL-CCNC: 20 U/L (ref 10–35)
BASOPHILS # BLD AUTO: 52 CELLS/UL (ref 0–200)
BASOPHILS NFR BLD AUTO: 0.8 %
BILIRUB SERPL-MCNC: 0.7 MG/DL (ref 0.2–1.2)
BUN SERPL-MCNC: 17 MG/DL (ref 7–25)
BUN/CREAT SERPL: ABNORMAL (CALC) (ref 6–22)
CALCIUM SERPL-MCNC: 9.4 MG/DL (ref 8.6–10.3)
CHLORIDE SERPL-SCNC: 101 MMOL/L (ref 98–110)
CHOLEST SERPL-MCNC: 153 MG/DL
CHOLEST/HDLC SERPL: 3.3 (CALC)
CO2 SERPL-SCNC: 30 MMOL/L (ref 20–32)
CREAT SERPL-MCNC: 0.98 MG/DL (ref 0.7–1.35)
EGFR: 83 ML/MIN/1.73M2
EOSINOPHIL # BLD AUTO: 254 CELLS/UL (ref 15–500)
EOSINOPHIL NFR BLD AUTO: 3.9 %
ERYTHROCYTE [DISTWIDTH] IN BLOOD BY AUTOMATED COUNT: 13.1 % (ref 11–15)
GLOBULIN SER CALC-MCNC: 2.6 G/DL (CALC) (ref 1.9–3.7)
GLUCOSE SERPL-MCNC: 138 MG/DL (ref 65–99)
HBA1C MFR BLD: 6 % OF TOTAL HGB
HCT VFR BLD AUTO: 48.7 % (ref 38.5–50)
HDLC SERPL-MCNC: 47 MG/DL
HGB BLD-MCNC: 16.7 G/DL (ref 13.2–17.1)
LDLC SERPL CALC-MCNC: 75 MG/DL (CALC)
LYMPHOCYTES # BLD AUTO: 2321 CELLS/UL (ref 850–3900)
LYMPHOCYTES NFR BLD AUTO: 35.7 %
MCH RBC QN AUTO: 31.7 PG (ref 27–33)
MCHC RBC AUTO-ENTMCNC: 34.3 G/DL (ref 32–36)
MCV RBC AUTO: 92.4 FL (ref 80–100)
MONOCYTES # BLD AUTO: 592 CELLS/UL (ref 200–950)
MONOCYTES NFR BLD AUTO: 9.1 %
NEUTROPHILS # BLD AUTO: 3283 CELLS/UL (ref 1500–7800)
NEUTROPHILS NFR BLD AUTO: 50.5 %
NONHDLC SERPL-MCNC: 106 MG/DL (CALC)
PLATELET # BLD AUTO: 198 THOUSAND/UL (ref 140–400)
PMV BLD REES-ECKER: 10.7 FL (ref 7.5–12.5)
POTASSIUM SERPL-SCNC: 4.5 MMOL/L (ref 3.5–5.3)
PROT SERPL-MCNC: 7.1 G/DL (ref 6.1–8.1)
PSA SERPL-MCNC: 0.75 NG/ML
RBC # BLD AUTO: 5.27 MILLION/UL (ref 4.2–5.8)
SODIUM SERPL-SCNC: 139 MMOL/L (ref 135–146)
TRIGL SERPL-MCNC: 215 MG/DL
TSH SERPL-ACNC: 1.58 MIU/L (ref 0.4–4.5)
WBC # BLD AUTO: 6.5 THOUSAND/UL (ref 3.8–10.8)

## 2023-02-08 PROCEDURE — 1159F MED LIST DOCD IN RCRD: CPT | Mod: CPTII,S$GLB,, | Performed by: FAMILY MEDICINE

## 2023-02-08 PROCEDURE — 3008F BODY MASS INDEX DOCD: CPT | Mod: CPTII,S$GLB,, | Performed by: FAMILY MEDICINE

## 2023-02-08 PROCEDURE — 4010F PR ACE/ARB THEARPY RXD/TAKEN: ICD-10-PCS | Mod: CPTII,S$GLB,, | Performed by: FAMILY MEDICINE

## 2023-02-08 PROCEDURE — 3074F PR MOST RECENT SYSTOLIC BLOOD PRESSURE < 130 MM HG: ICD-10-PCS | Mod: CPTII,S$GLB,, | Performed by: FAMILY MEDICINE

## 2023-02-08 PROCEDURE — 3078F DIAST BP <80 MM HG: CPT | Mod: CPTII,S$GLB,, | Performed by: FAMILY MEDICINE

## 2023-02-08 PROCEDURE — 1159F PR MEDICATION LIST DOCUMENTED IN MEDICAL RECORD: ICD-10-PCS | Mod: CPTII,S$GLB,, | Performed by: FAMILY MEDICINE

## 2023-02-08 PROCEDURE — 3078F PR MOST RECENT DIASTOLIC BLOOD PRESSURE < 80 MM HG: ICD-10-PCS | Mod: CPTII,S$GLB,, | Performed by: FAMILY MEDICINE

## 2023-02-08 PROCEDURE — 99214 PR OFFICE/OUTPT VISIT, EST, LEVL IV, 30-39 MIN: ICD-10-PCS | Mod: S$GLB,,, | Performed by: FAMILY MEDICINE

## 2023-02-08 PROCEDURE — 99214 OFFICE O/P EST MOD 30 MIN: CPT | Mod: S$GLB,,, | Performed by: FAMILY MEDICINE

## 2023-02-08 PROCEDURE — 3008F PR BODY MASS INDEX (BMI) DOCUMENTED: ICD-10-PCS | Mod: CPTII,S$GLB,, | Performed by: FAMILY MEDICINE

## 2023-02-08 PROCEDURE — 3044F HG A1C LEVEL LT 7.0%: CPT | Mod: CPTII,S$GLB,, | Performed by: FAMILY MEDICINE

## 2023-02-08 PROCEDURE — 4010F ACE/ARB THERAPY RXD/TAKEN: CPT | Mod: CPTII,S$GLB,, | Performed by: FAMILY MEDICINE

## 2023-02-08 PROCEDURE — 3074F SYST BP LT 130 MM HG: CPT | Mod: CPTII,S$GLB,, | Performed by: FAMILY MEDICINE

## 2023-02-08 PROCEDURE — 3044F PR MOST RECENT HEMOGLOBIN A1C LEVEL <7.0%: ICD-10-PCS | Mod: CPTII,S$GLB,, | Performed by: FAMILY MEDICINE

## 2023-02-08 RX ORDER — LOSARTAN POTASSIUM 50 MG/1
50 TABLET ORAL DAILY
Qty: 90 TABLET | Refills: 3 | Status: SHIPPED | OUTPATIENT
Start: 2023-02-08 | End: 2024-01-19 | Stop reason: SDUPTHER

## 2023-02-08 RX ORDER — DAPAGLIFLOZIN 5 MG/1
5 TABLET, FILM COATED ORAL DAILY
Qty: 90 TABLET | Refills: 3 | Status: SHIPPED | OUTPATIENT
Start: 2023-02-08 | End: 2024-02-08 | Stop reason: SDUPTHER

## 2023-02-08 RX ORDER — OMEPRAZOLE 20 MG/1
20 CAPSULE, DELAYED RELEASE ORAL DAILY
Qty: 90 CAPSULE | Refills: 3 | Status: SHIPPED | OUTPATIENT
Start: 2023-02-08 | End: 2024-02-26 | Stop reason: SDUPTHER

## 2023-02-08 RX ORDER — METFORMIN HYDROCHLORIDE 750 MG/1
750 TABLET, EXTENDED RELEASE ORAL 2 TIMES DAILY WITH MEALS
Qty: 180 TABLET | Refills: 3 | Status: SHIPPED | OUTPATIENT
Start: 2023-02-08 | End: 2024-02-26 | Stop reason: SDUPTHER

## 2023-02-08 NOTE — LETTER
[address] [facility] [address]  [place] [address]  Phone: [phone]   [phone]                        MD Makeda Rincon MD Chequita Williams, MD Matthew Bassett, PA-C Linda Melerine, NP Jodi Powell, NP Hunter Reed, PA-C      Date: 02/08/2023        Patient: Jacob Lou  YOB: 1955      Please send the most recent Eye exam.         Sincerely,     Oni Cisneros MA

## 2023-02-08 NOTE — MEDICAL/APP STUDENT
Subjective:       Patient ID: Jacob Lou is a 67 y.o. male here for 6 month check up. He has no complaints today.    He has gained 3 lbs since his last visit. Pt has a history of diabetes managed with metformin and dapagliflozin. His latest A1c was 6.0. He reports a diet limited in carbohydrates, but drinks a wine or glass of bourbon every night. He does reports he eats too many fried foods. Pt walks 2 miles every day. He takes one krill oil a day. He completed his diabetic eye exam in August 2022.     Pt reports he is scheduled for his next colonoscopy on 3/2/23. He is UTD on vaccinations with the exception of updated Shingrix vaccinations.      Chief Complaint: Diabetes (No bottles, Foot exam ordered, requested Eye exam Dr Tanner, Lab-results,abc )    Diabetes  Pertinent negatives for hypoglycemia include no headaches. Pertinent negatives for diabetes include no chest pain and no fatigue.   Review of Systems   Constitutional:  Negative for diaphoresis, fatigue and unexpected weight change.   HENT:  Negative for sore throat and trouble swallowing.    Eyes:  Negative for pain and visual disturbance.   Respiratory:  Negative for chest tightness and shortness of breath.    Cardiovascular:  Negative for chest pain, palpitations and leg swelling.   Gastrointestinal:  Negative for abdominal distention and abdominal pain.   Endocrine: Negative.    Genitourinary:  Negative for bladder incontinence and difficulty urinating.   Musculoskeletal:  Negative for arthralgias and back pain.   Integumentary:  Negative for rash, wound and mole/lesion.   Allergic/Immunologic: Negative.    Neurological:  Negative for light-headedness and headaches.   Hematological: Negative.    Psychiatric/Behavioral: Negative.         Objective:      Physical Exam  Vitals reviewed.   Constitutional:       Appearance: Normal appearance.   HENT:      Head: Normocephalic and atraumatic.      Right Ear: Tympanic membrane, ear canal and external ear  normal.      Left Ear: Tympanic membrane, ear canal and external ear normal.      Mouth/Throat:      Mouth: Mucous membranes are moist.      Pharynx: Oropharynx is clear.   Eyes:      Extraocular Movements: Extraocular movements intact.      Conjunctiva/sclera: Conjunctivae normal.      Pupils: Pupils are equal, round, and reactive to light.   Cardiovascular:      Rate and Rhythm: Normal rate and regular rhythm.      Pulses: Normal pulses.      Heart sounds: Normal heart sounds.   Pulmonary:      Effort: Pulmonary effort is normal.      Breath sounds: Normal breath sounds.   Abdominal:      General: Abdomen is flat. Bowel sounds are normal.      Palpations: Abdomen is soft.   Musculoskeletal:         General: Normal range of motion.      Cervical back: Normal range of motion and neck supple.      Right lower leg: No edema.      Left lower leg: No edema.   Skin:     General: Skin is warm and dry.      Findings: No rash.   Neurological:      General: No focal deficit present.      Mental Status: He is alert and oriented to person, place, and time.      Sensory: No sensory deficit.      Motor: No weakness.      Gait: Gait normal.   Psychiatric:         Mood and Affect: Mood normal.         Behavior: Behavior normal.       Assessment:       Problem List Items Addressed This Visit          Endocrine    Uncomplicated type 2 diabetes mellitus       GI    Gastroesophageal reflux disease without esophagitis     Other Visit Diagnoses       Benign essential HTN                  Plan:

## 2023-02-09 NOTE — PROGRESS NOTES
SUBJECTIVE:    Patient ID: Jacob Lou is a 67 y.o. male.    Chief Complaint: Diabetes (No bottles, Foot exam ordered, requested Eye exam Dr Tanner, Lab-results,abc )    Patient ID: Jacob Lou is a 67 y.o. male here for 6 month check up. He has no complaints today.     He has gained 3 lbs since his last visit. Pt has a history of diabetes managed with metformin and dapagliflozin. His latest A1c was 6.0. He reports a diet limited in carbohydrates, but drinks a wine or glass of bourbon every night. He does reports he eats too many fried foods. Pt walks 2 miles every day. He takes one krill oil a day. He completed his diabetic eye exam in August 2022.     Pt reports he is scheduled for his next colonoscopy on 3/2/23. He is UTD on vaccinations with the exception of updated Shingrix vaccinations.        Chief Complaint: Diabetes (No bottles, Foot exam ordered, requested Eye exam Dr Tanner, Lab-results,abc )     Diabetes  Pertinent negatives for hypoglycemia include no headaches. Pertinent negatives for diabetes include no chest pain and no fatigue.   Review of Systems   Constitutional:  Negative for diaphoresis, fatigue and unexpected weight change.   HENT:  Negative for sore throat and trouble swallowing.    Eyes:  Negative for pain and visual disturbance.   Respiratory:  Negative for chest tightness and shortness of breath.    Cardiovascular:  Negative for chest pain, palpitations and leg swelling.   Gastrointestinal:  Negative for abdominal distention and abdominal pain.   Endocrine: Negative.    Genitourinary:  Negative for bladder incontinence and difficulty urinating.   Musculoskeletal:  Negative for arthralgias and back pain.   Integumentary:  Negative for rash, wound and mole/lesion.   Allergic/Immunologic: Negative.    Neurological:  Negative for light-headedness and headaches.   Hematological: Negative.    Psychiatric/Behavioral: Negative.           Objective:  Physical Exam  Vitals reviewed.    Constitutional:       Appearance: Normal appearance.   HENT:      Head: Normocephalic and atraumatic.      Right Ear: Tympanic membrane, ear canal and external ear normal.      Left Ear: Tympanic membrane, ear canal and external ear normal.      Mouth/Throat:      Mouth: Mucous membranes are moist.      Pharynx: Oropharynx is clear.   Eyes:      Extraocular Movements: Extraocular movements intact.      Conjunctiva/sclera: Conjunctivae normal.      Pupils: Pupils are equal, round, and reactive to light.   Cardiovascular:      Rate and Rhythm: Normal rate and regular rhythm.      Pulses: Normal pulses.      Heart sounds: Normal heart sounds.   Pulmonary:      Effort: Pulmonary effort is normal.      Breath sounds: Normal breath sounds.   Abdominal:      General: Abdomen is flat. Bowel sounds are normal.      Palpations: Abdomen is soft.   Musculoskeletal:         General: Normal range of motion.      Cervical back: Normal range of motion and neck supple.      Right lower leg: No edema.      Left lower leg: No edema.   Skin:     General: Skin is warm and dry.      Findings: No rash.   Neurological:      General: No focal deficit present.      Mental Status: He is alert and oriented to person, place, and time.      Sensory: No sensory deficit.      Motor: No weakness.      Gait: Gait normal.   Psychiatric:         Mood and Affect: Mood normal.         Behavior: Behavior normal.               Telephone on 02/06/2023   Component Date Value Ref Range Status    WBC 02/07/2023 6.5  3.8 - 10.8 Thousand/uL Final    RBC 02/07/2023 5.27  4.20 - 5.80 Million/uL Final    Hemoglobin 02/07/2023 16.7  13.2 - 17.1 g/dL Final    Hematocrit 02/07/2023 48.7  38.5 - 50.0 % Final    MCV 02/07/2023 92.4  80.0 - 100.0 fL Final    MCH 02/07/2023 31.7  27.0 - 33.0 pg Final    MCHC 02/07/2023 34.3  32.0 - 36.0 g/dL Final    RDW 02/07/2023 13.1  11.0 - 15.0 % Final    Platelets 02/07/2023 198  140 - 400 Thousand/uL Final    MPV 02/07/2023 10.7   7.5 - 12.5 fL Final    Neutrophils, Abs 02/07/2023 3,283  1,500 - 7,800 cells/uL Final    Lymph # 02/07/2023 2,321  850 - 3,900 cells/uL Final    Mono # 02/07/2023 592  200 - 950 cells/uL Final    Eos # 02/07/2023 254  15 - 500 cells/uL Final    Baso # 02/07/2023 52  0 - 200 cells/uL Final    Neutrophils Relative 02/07/2023 50.5  % Final    Lymph % 02/07/2023 35.7  % Final    Mono % 02/07/2023 9.1  % Final    Eosinophil % 02/07/2023 3.9  % Final    Basophil % 02/07/2023 0.8  % Final    Glucose 02/07/2023 138 (H)  65 - 99 mg/dL Final    BUN 02/07/2023 17  7 - 25 mg/dL Final    Creatinine 02/07/2023 0.98  0.70 - 1.35 mg/dL Final    eGFR 02/07/2023 83  > OR = 60 mL/min/1.73m2 Final    BUN/Creatinine Ratio 02/07/2023 NOT APPLICABLE  6 - 22 (calc) Final    Sodium 02/07/2023 139  135 - 146 mmol/L Final    Potassium 02/07/2023 4.5  3.5 - 5.3 mmol/L Final    Chloride 02/07/2023 101  98 - 110 mmol/L Final    CO2 02/07/2023 30  20 - 32 mmol/L Final    Calcium 02/07/2023 9.4  8.6 - 10.3 mg/dL Final    Total Protein 02/07/2023 7.1  6.1 - 8.1 g/dL Final    Albumin 02/07/2023 4.5  3.6 - 5.1 g/dL Final    Globulin, Total 02/07/2023 2.6  1.9 - 3.7 g/dL (calc) Final    Albumin/Globulin Ratio 02/07/2023 1.7  1.0 - 2.5 (calc) Final    Total Bilirubin 02/07/2023 0.7  0.2 - 1.2 mg/dL Final    Alkaline Phosphatase 02/07/2023 42  35 - 144 U/L Final    AST 02/07/2023 20  10 - 35 U/L Final    ALT 02/07/2023 27  9 - 46 U/L Final    Cholesterol 02/07/2023 153  <200 mg/dL Final    HDL 02/07/2023 47  > OR = 40 mg/dL Final    Triglycerides 02/07/2023 215 (H)  <150 mg/dL Final    LDL Cholesterol 02/07/2023 75  mg/dL (calc) Final    HDL/Cholesterol Ratio 02/07/2023 3.3  <5.0 (calc) Final    Non HDL Chol. (LDL+VLDL) 02/07/2023 106  <130 mg/dL (calc) Final    TSH w/reflex to FT4 02/07/2023 1.58  0.40 - 4.50 mIU/L Final    PROSTATE SPECIFIC ANTIGEN, SCR - Q* 02/07/2023 0.75  < OR = 4.00 ng/mL Final    Hemoglobin A1C 02/07/2023 6.0 (H)  <5.7 % of  total Hgb Final       Past Medical History:   Diagnosis Date    GERD (gastroesophageal reflux disease)     Hyperlipidemia     Squamous cell carcinoma of skin      Social History     Socioeconomic History    Marital status:    Tobacco Use    Smoking status: Never    Smokeless tobacco: Never   Substance and Sexual Activity    Alcohol use: Yes     Alcohol/week: 1.0 standard drink     Types: 1 Glasses of wine per week    Drug use: No    Sexual activity: Yes     Partners: Female     Social Determinants of Health     Financial Resource Strain: Unknown    Difficulty of Paying Living Expenses: Patient refused   Food Insecurity: Unknown    Worried About Running Out of Food in the Last Year: Patient refused    Ran Out of Food in the Last Year: Patient refused   Transportation Needs: No Transportation Needs    Lack of Transportation (Medical): No    Lack of Transportation (Non-Medical): No   Physical Activity: Unknown    Days of Exercise per Week: Patient refused   Stress: No Stress Concern Present    Feeling of Stress : Not at all   Social Connections: Unknown    Frequency of Communication with Friends and Family: More than three times a week    Frequency of Social Gatherings with Friends and Family: Patient refused    Active Member of Clubs or Organizations: Yes    Attends Club or Organization Meetings: More than 4 times per year    Marital Status:    Housing Stability: Unknown    Unable to Pay for Housing in the Last Year: Patient refused    Unstable Housing in the Last Year: No     Past Surgical History:   Procedure Laterality Date    cataracts  2019    Dr MARYANN Martinez    COLONOSCOPY N/A 11/30/2017    Procedure: COLONOSCOPY;  Surgeon: Maikel Medina MD;  Location: [facility];  Service: Endoscopy;  Laterality: N/A;    CORONARY ARTERY BYPASS GRAFT  11/02/2020    Dr Mims- 3 vessel    EYE SURGERY  April 2019    FOOT SURGERY      HERNIA REPAIR       Family History   Problem Relation Age of Onset    Diabetes Mother      Hypertension Father        Review of patient's allergies indicates:  No Known Allergies    Current Outpatient Medications:     ascorbic acid, vitamin C, 500 mg Cap, ascorbic acid (vitamin C) 500 mg capsule  Take 2 capsules every day by oral route., Disp: , Rfl:     aspirin (ECOTRIN) 81 MG EC tablet, Take 81 mg by mouth once daily., Disp: , Rfl:     blood sugar diagnostic Strp, To check BG 1 times daily, to use with insurance preferred meter, Disp: 100 strip, Rfl: 0    coenzyme Q10 100 mg capsule, Co Q-10 100 mg capsule  Take 1 capsule every day by oral route., Disp: , Rfl:     ibuprofen (ADVIL,MOTRIN) 200 MG tablet, Take 200 mg by mouth every 8 (eight) hours as needed for Pain., Disp: , Rfl:     KRILL OIL ORAL, Take by mouth once daily., Disp: , Rfl:     lancets Misc, To check BG 1 times daily, to use with insurance preferred meter, Disp: 100 each, Rfl: 1    rosuvastatin (CRESTOR) 20 MG tablet, Take 1 tablet (20 mg total) by mouth once daily., Disp: 90 tablet, Rfl: 2    blood-glucose meter kit, To check BG 1 times daily, to use with insurance preferred meter, Disp: 1 each, Rfl: 0    ciprofloxacin HCl (CIPRO) 500 MG tablet, Take 1 tablet (500 mg total) by mouth 2 (two) times daily., Disp: 14 tablet, Rfl: 0    dapagliflozin (FARXIGA) 5 mg Tab tablet, Take 1 tablet (5 mg total) by mouth once daily., Disp: 90 tablet, Rfl: 3    losartan (COZAAR) 50 MG tablet, Take 1 tablet (50 mg total) by mouth once daily., Disp: 90 tablet, Rfl: 3    metFORMIN (GLUCOPHAGE-XR) 750 MG ER 24hr tablet, Take 1 tablet (750 mg total) by mouth 2 (two) times daily with meals., Disp: 180 tablet, Rfl: 3    omeprazole (PRILOSEC) 20 MG capsule, Take 1 capsule (20 mg total) by mouth once daily., Disp: 90 capsule, Rfl: 3    Review of Systems       Objective:      Vitals:    02/08/23 0912   BP: 112/70   Pulse: 67   Weight: 105.2 kg (232 lb)   Height: 6' (1.829 m)     Physical Exam  Feet:      Right foot:      Protective Sensation: 5 sites tested.  5  sites sensed.      Skin integrity: Skin integrity normal.      Left foot:      Protective Sensation: 5 sites tested.  5 sites sensed.      Skin integrity: Skin integrity normal.         Assessment:       1. Type 2 diabetes mellitus without complication, without long-term current use of insulin    2. Benign essential HTN    3. Gastroesophageal reflux disease without esophagitis    4. Type 2 diabetes mellitus without complication, without long-term current use of insulin    5. Family history of coronary artery disease    6. Mixed hyperlipidemia    7. Status post three vessel coronary artery bypass    8. Erectile dysfunction due to arterial insufficiency    9. Arthritis of right ankle           Plan:       Type 2 diabetes mellitus without complication, without long-term current use of insulin  Comments:  Greatly improved to 6.0% today. NO changes  Orders:  -     Foot Exam Performed  -     metFORMIN (GLUCOPHAGE-XR) 750 MG ER 24hr tablet; Take 1 tablet (750 mg total) by mouth 2 (two) times daily with meals.  Dispense: 180 tablet; Refill: 3  -     dapagliflozin (FARXIGA) 5 mg Tab tablet; Take 1 tablet (5 mg total) by mouth once daily.  Dispense: 90 tablet; Refill: 3  -     CBC Auto Differential; Future; Expected date: 02/08/2023  -     Comprehensive Metabolic Panel; Future; Expected date: 02/08/2023  -     Lipid Panel; Future; Expected date: 02/08/2023  -     Hemoglobin A1C; Future; Expected date: 02/08/2023  -     TSH w/reflex to FT4; Future; Expected date: 02/08/2023  -     Urinalysis, Reflex to Urine Culture Urine, Clean Catch; Future; Expected date: 02/08/2023  -     Microalbumin/Creatinine Ratio, Urine; Future; Expected date: 02/08/2023  Repeat labs in 6 months  Benign essential HTN  -     losartan (COZAAR) 50 MG tablet; Take 1 tablet (50 mg total) by mouth once daily.  Dispense: 90 tablet; Refill: 3  Blood pressure well controlled today  Gastroesophageal reflux disease without esophagitis  Comments:  Doing well with  omeprazole. continue as is.  Orders:  -     omeprazole (PRILOSEC) 20 MG capsule; Take 1 capsule (20 mg total) by mouth once daily.  Dispense: 90 capsule; Refill: 3    Type 2 diabetes mellitus without complication, without long-term current use of insulin  -     Foot Exam Performed  -     metFORMIN (GLUCOPHAGE-XR) 750 MG ER 24hr tablet; Take 1 tablet (750 mg total) by mouth 2 (two) times daily with meals.  Dispense: 180 tablet; Refill: 3  -     dapagliflozin (FARXIGA) 5 mg Tab tablet; Take 1 tablet (5 mg total) by mouth once daily.  Dispense: 90 tablet; Refill: 3  -     CBC Auto Differential; Future; Expected date: 02/08/2023  -     Comprehensive Metabolic Panel; Future; Expected date: 02/08/2023  -     Lipid Panel; Future; Expected date: 02/08/2023  -     Hemoglobin A1C; Future; Expected date: 02/08/2023  -     TSH w/reflex to FT4; Future; Expected date: 02/08/2023  -     Urinalysis, Reflex to Urine Culture Urine, Clean Catch; Future; Expected date: 02/08/2023  -     Microalbumin/Creatinine Ratio, Urine; Future; Expected date: 02/08/2023  Coronary artery disease-stable, no angina lately.  Hyperlipidemia-cholesterol 153 HDL 47  LDL 75, will increase Krill oil to b.i.d. to help triglycerides.  Cut back on fried foods also    Follow up in about 6 months (around 8/8/2023) for Diabetic Check-Up.        2/8/2023 Tyler Abel

## 2023-04-11 ENCOUNTER — PATIENT MESSAGE (OUTPATIENT)
Dept: ADMINISTRATIVE | Facility: HOSPITAL | Age: 68
End: 2023-04-11

## 2023-05-22 ENCOUNTER — TELEPHONE (OUTPATIENT)
Dept: FAMILY MEDICINE | Facility: CLINIC | Age: 68
End: 2023-05-22

## 2023-05-22 NOTE — TELEPHONE ENCOUNTER
----- Message from Roberta Platt MA sent at 5/22/2023 11:32 AM CDT -----  Regarding: refill  [facility] calling stating the pt would like to use their mail order service. [phone]

## 2023-07-26 ENCOUNTER — TELEPHONE (OUTPATIENT)
Dept: FAMILY MEDICINE | Facility: CLINIC | Age: 68
End: 2023-07-26

## 2023-08-08 LAB
ALBUMIN SERPL-MCNC: 4.6 G/DL (ref 3.6–5.1)
ALBUMIN/CREAT UR: 4 MCG/MG CREAT
ALBUMIN/GLOB SERPL: 1.6 (CALC) (ref 1–2.5)
ALP SERPL-CCNC: 46 U/L (ref 35–144)
ALT SERPL-CCNC: 34 U/L (ref 9–46)
APPEARANCE UR: CLEAR
AST SERPL-CCNC: 19 U/L (ref 10–35)
BACTERIA #/AREA URNS HPF: ABNORMAL /HPF
BACTERIA UR CULT: ABNORMAL
BASOPHILS # BLD AUTO: 38 CELLS/UL (ref 0–200)
BASOPHILS NFR BLD AUTO: 0.6 %
BILIRUB SERPL-MCNC: 0.9 MG/DL (ref 0.2–1.2)
BILIRUB UR QL STRIP: NEGATIVE
BUN SERPL-MCNC: 16 MG/DL (ref 7–25)
BUN/CREAT SERPL: ABNORMAL (CALC) (ref 6–22)
CALCIUM SERPL-MCNC: 9.3 MG/DL (ref 8.6–10.3)
CHLORIDE SERPL-SCNC: 100 MMOL/L (ref 98–110)
CHOLEST SERPL-MCNC: 142 MG/DL
CHOLEST/HDLC SERPL: 3.2 (CALC)
CO2 SERPL-SCNC: 30 MMOL/L (ref 20–32)
COLOR UR: YELLOW
CREAT SERPL-MCNC: 0.93 MG/DL (ref 0.7–1.35)
CREAT UR-MCNC: 125 MG/DL (ref 20–320)
EGFR: 89 ML/MIN/1.73M2
EOSINOPHIL # BLD AUTO: 173 CELLS/UL (ref 15–500)
EOSINOPHIL NFR BLD AUTO: 2.7 %
ERYTHROCYTE [DISTWIDTH] IN BLOOD BY AUTOMATED COUNT: 12.6 % (ref 11–15)
GLOBULIN SER CALC-MCNC: 2.8 G/DL (CALC) (ref 1.9–3.7)
GLUCOSE SERPL-MCNC: 134 MG/DL (ref 65–99)
GLUCOSE UR QL STRIP: ABNORMAL
HBA1C MFR BLD: 6.1 % OF TOTAL HGB
HCT VFR BLD AUTO: 47.8 % (ref 38.5–50)
HDLC SERPL-MCNC: 44 MG/DL
HGB BLD-MCNC: 16.7 G/DL (ref 13.2–17.1)
HGB UR QL STRIP: NEGATIVE
HYALINE CASTS #/AREA URNS LPF: ABNORMAL /LPF
KETONES UR QL STRIP: NEGATIVE
LDLC SERPL CALC-MCNC: 70 MG/DL (CALC)
LEUKOCYTE ESTERASE UR QL STRIP: NEGATIVE
LYMPHOCYTES # BLD AUTO: 2387 CELLS/UL (ref 850–3900)
LYMPHOCYTES NFR BLD AUTO: 37.3 %
MCH RBC QN AUTO: 32.1 PG (ref 27–33)
MCHC RBC AUTO-ENTMCNC: 34.9 G/DL (ref 32–36)
MCV RBC AUTO: 91.9 FL (ref 80–100)
MICROALBUMIN UR-MCNC: 0.5 MG/DL
MONOCYTES # BLD AUTO: 730 CELLS/UL (ref 200–950)
MONOCYTES NFR BLD AUTO: 11.4 %
NEUTROPHILS # BLD AUTO: 3072 CELLS/UL (ref 1500–7800)
NEUTROPHILS NFR BLD AUTO: 48 %
NITRITE UR QL STRIP: NEGATIVE
NONHDLC SERPL-MCNC: 98 MG/DL (CALC)
PH UR STRIP: 5.5 [PH] (ref 5–8)
PLATELET # BLD AUTO: 185 THOUSAND/UL (ref 140–400)
PMV BLD REES-ECKER: 10.3 FL (ref 7.5–12.5)
POTASSIUM SERPL-SCNC: 4.3 MMOL/L (ref 3.5–5.3)
PROT SERPL-MCNC: 7.4 G/DL (ref 6.1–8.1)
PROT UR QL STRIP: NEGATIVE
RBC # BLD AUTO: 5.2 MILLION/UL (ref 4.2–5.8)
RBC #/AREA URNS HPF: ABNORMAL /HPF
SERVICE CMNT-IMP: ABNORMAL
SODIUM SERPL-SCNC: 137 MMOL/L (ref 135–146)
SP GR UR STRIP: 1.02 (ref 1–1.03)
SQUAMOUS #/AREA URNS HPF: ABNORMAL /HPF
TRIGL SERPL-MCNC: 224 MG/DL
TSH SERPL-ACNC: 1.73 MIU/L (ref 0.4–4.5)
WBC # BLD AUTO: 6.4 THOUSAND/UL (ref 3.8–10.8)
WBC #/AREA URNS HPF: ABNORMAL /HPF

## 2023-08-10 ENCOUNTER — OFFICE VISIT (OUTPATIENT)
Dept: FAMILY MEDICINE | Facility: CLINIC | Age: 68
End: 2023-08-10
Payer: MEDICARE

## 2023-08-10 VITALS
HEART RATE: 72 BPM | HEIGHT: 72 IN | WEIGHT: 229 LBS | SYSTOLIC BLOOD PRESSURE: 112 MMHG | DIASTOLIC BLOOD PRESSURE: 76 MMHG | BODY MASS INDEX: 31.02 KG/M2

## 2023-08-10 DIAGNOSIS — Z82.49 FAMILY HISTORY OF CORONARY ARTERY DISEASE: ICD-10-CM

## 2023-08-10 DIAGNOSIS — E11.9 TYPE 2 DIABETES MELLITUS WITHOUT COMPLICATION, WITHOUT LONG-TERM CURRENT USE OF INSULIN: ICD-10-CM

## 2023-08-10 DIAGNOSIS — Z12.5 SPECIAL SCREENING FOR MALIGNANT NEOPLASM OF PROSTATE: ICD-10-CM

## 2023-08-10 DIAGNOSIS — N52.01 ERECTILE DYSFUNCTION DUE TO ARTERIAL INSUFFICIENCY: ICD-10-CM

## 2023-08-10 DIAGNOSIS — Z95.1 STATUS POST THREE VESSEL CORONARY ARTERY BYPASS: ICD-10-CM

## 2023-08-10 DIAGNOSIS — M19.071 ARTHRITIS OF RIGHT ANKLE: ICD-10-CM

## 2023-08-10 DIAGNOSIS — E78.2 MIXED HYPERLIPIDEMIA: Primary | ICD-10-CM

## 2023-08-10 DIAGNOSIS — K21.9 GASTROESOPHAGEAL REFLUX DISEASE WITHOUT ESOPHAGITIS: ICD-10-CM

## 2023-08-10 PROCEDURE — 4010F PR ACE/ARB THEARPY RXD/TAKEN: ICD-10-PCS | Mod: CPTII,S$GLB,, | Performed by: FAMILY MEDICINE

## 2023-08-10 PROCEDURE — 99214 PR OFFICE/OUTPT VISIT, EST, LEVL IV, 30-39 MIN: ICD-10-PCS | Mod: S$GLB,,, | Performed by: FAMILY MEDICINE

## 2023-08-10 PROCEDURE — 3061F NEG MICROALBUMINURIA REV: CPT | Mod: CPTII,S$GLB,, | Performed by: FAMILY MEDICINE

## 2023-08-10 PROCEDURE — 3078F PR MOST RECENT DIASTOLIC BLOOD PRESSURE < 80 MM HG: ICD-10-PCS | Mod: CPTII,S$GLB,, | Performed by: FAMILY MEDICINE

## 2023-08-10 PROCEDURE — 3066F PR DOCUMENTATION OF TREATMENT FOR NEPHROPATHY: ICD-10-PCS | Mod: CPTII,S$GLB,, | Performed by: FAMILY MEDICINE

## 2023-08-10 PROCEDURE — 3008F PR BODY MASS INDEX (BMI) DOCUMENTED: ICD-10-PCS | Mod: CPTII,S$GLB,, | Performed by: FAMILY MEDICINE

## 2023-08-10 PROCEDURE — 3008F BODY MASS INDEX DOCD: CPT | Mod: CPTII,S$GLB,, | Performed by: FAMILY MEDICINE

## 2023-08-10 PROCEDURE — 1159F MED LIST DOCD IN RCRD: CPT | Mod: CPTII,S$GLB,, | Performed by: FAMILY MEDICINE

## 2023-08-10 PROCEDURE — 3066F NEPHROPATHY DOC TX: CPT | Mod: CPTII,S$GLB,, | Performed by: FAMILY MEDICINE

## 2023-08-10 PROCEDURE — 3074F PR MOST RECENT SYSTOLIC BLOOD PRESSURE < 130 MM HG: ICD-10-PCS | Mod: CPTII,S$GLB,, | Performed by: FAMILY MEDICINE

## 2023-08-10 PROCEDURE — 3044F PR MOST RECENT HEMOGLOBIN A1C LEVEL <7.0%: ICD-10-PCS | Mod: CPTII,S$GLB,, | Performed by: FAMILY MEDICINE

## 2023-08-10 PROCEDURE — 3288F PR FALLS RISK ASSESSMENT DOCUMENTED: ICD-10-PCS | Mod: CPTII,S$GLB,, | Performed by: FAMILY MEDICINE

## 2023-08-10 PROCEDURE — 1101F PR PT FALLS ASSESS DOC 0-1 FALLS W/OUT INJ PAST YR: ICD-10-PCS | Mod: CPTII,S$GLB,, | Performed by: FAMILY MEDICINE

## 2023-08-10 PROCEDURE — 3078F DIAST BP <80 MM HG: CPT | Mod: CPTII,S$GLB,, | Performed by: FAMILY MEDICINE

## 2023-08-10 PROCEDURE — 3044F HG A1C LEVEL LT 7.0%: CPT | Mod: CPTII,S$GLB,, | Performed by: FAMILY MEDICINE

## 2023-08-10 PROCEDURE — 1159F PR MEDICATION LIST DOCUMENTED IN MEDICAL RECORD: ICD-10-PCS | Mod: CPTII,S$GLB,, | Performed by: FAMILY MEDICINE

## 2023-08-10 PROCEDURE — 4010F ACE/ARB THERAPY RXD/TAKEN: CPT | Mod: CPTII,S$GLB,, | Performed by: FAMILY MEDICINE

## 2023-08-10 PROCEDURE — 1101F PT FALLS ASSESS-DOCD LE1/YR: CPT | Mod: CPTII,S$GLB,, | Performed by: FAMILY MEDICINE

## 2023-08-10 PROCEDURE — 3288F FALL RISK ASSESSMENT DOCD: CPT | Mod: CPTII,S$GLB,, | Performed by: FAMILY MEDICINE

## 2023-08-10 PROCEDURE — 99214 OFFICE O/P EST MOD 30 MIN: CPT | Mod: S$GLB,,, | Performed by: FAMILY MEDICINE

## 2023-08-10 PROCEDURE — 3061F PR NEG MICROALBUMINURIA RESULT DOCUMENTED/REVIEW: ICD-10-PCS | Mod: CPTII,S$GLB,, | Performed by: FAMILY MEDICINE

## 2023-08-10 PROCEDURE — 3074F SYST BP LT 130 MM HG: CPT | Mod: CPTII,S$GLB,, | Performed by: FAMILY MEDICINE

## 2023-08-10 NOTE — PROGRESS NOTES
SUBJECTIVE:    Patient ID: Jacob Lou is a 67 y.o. male.    Chief Complaint: Diabetes (No bottles, multiple joins pain, upcoming appt Dr Kirk 9/19/23, upcoming appt Dr Hill Cardiologist, review Lab-results, pt saw Dr Juanito Ashby Sleep apnea  8/7/23, abc )    67-year-old diabetic male with coronary artery disease stays active doing yd work and outdoor exercise.  He has sore and achy muscles especially with inactivity.  Ibuprofen helps but he rarely takes it.  No chest pain or shortness of breath with exertion.    He does state that he has been statin intolerant in the past and had to try to statins before he found he could tolerate rosuvastatin.  He still thinks he causes achy muscles.  He has lost 4 lb since his last visit.    2020-3 vessel CABG, follows with cardiology Dr. Hill in [place].    Diabetes type 2, controlled well with Farxiga and metformin.  A1c is now 6.1.    GERD-takes omeprazole every other day and is feeling fine    2023-colonoscopy Dr. Medina-no RTC necessary    Five years ago had a shingles vaccine, has not had Shingrix yet            No visits with results within 6 Month(s) from this visit.   Latest known visit with results is:   Office Visit on 02/08/2023   Component Date Value Ref Range Status    WBC 08/07/2023 6.4  3.8 - 10.8 Thousand/uL Final    RBC 08/07/2023 5.20  4.20 - 5.80 Million/uL Final    Hemoglobin 08/07/2023 16.7  13.2 - 17.1 g/dL Final    Hematocrit 08/07/2023 47.8  38.5 - 50.0 % Final    MCV 08/07/2023 91.9  80.0 - 100.0 fL Final    MCH 08/07/2023 32.1  27.0 - 33.0 pg Final    MCHC 08/07/2023 34.9  32.0 - 36.0 g/dL Final    RDW 08/07/2023 12.6  11.0 - 15.0 % Final    Platelets 08/07/2023 185  140 - 400 Thousand/uL Final    MPV 08/07/2023 10.3  7.5 - 12.5 fL Final    Neutrophils, Abs 08/07/2023 3,072  1,500 - 7,800 cells/uL Final    Lymph # 08/07/2023 2,387  850 - 3,900 cells/uL Final    Mono # 08/07/2023 730  200 - 950 cells/uL Final    Eos # 08/07/2023 173  15 - 500  cells/uL Final    Baso # 08/07/2023 38  0 - 200 cells/uL Final    Neutrophils Relative 08/07/2023 48  % Final    Lymph % 08/07/2023 37.3  % Final    Mono % 08/07/2023 11.4  % Final    Eosinophil % 08/07/2023 2.7  % Final    Basophil % 08/07/2023 0.6  % Final    Glucose 08/07/2023 134 (H)  65 - 99 mg/dL Final    BUN 08/07/2023 16  7 - 25 mg/dL Final    Creatinine 08/07/2023 0.93  0.70 - 1.35 mg/dL Final    eGFR 08/07/2023 89  > OR = 60 mL/min/1.73m2 Final    BUN/Creatinine Ratio 08/07/2023 SEE NOTE:  6 - 22 (calc) Final    Sodium 08/07/2023 137  135 - 146 mmol/L Final    Potassium 08/07/2023 4.3  3.5 - 5.3 mmol/L Final    Chloride 08/07/2023 100  98 - 110 mmol/L Final    CO2 08/07/2023 30  20 - 32 mmol/L Final    Calcium 08/07/2023 9.3  8.6 - 10.3 mg/dL Final    Total Protein 08/07/2023 7.4  6.1 - 8.1 g/dL Final    Albumin 08/07/2023 4.6  3.6 - 5.1 g/dL Final    Globulin, Total 08/07/2023 2.8  1.9 - 3.7 g/dL (calc) Final    Albumin/Globulin Ratio 08/07/2023 1.6  1.0 - 2.5 (calc) Final    Total Bilirubin 08/07/2023 0.9  0.2 - 1.2 mg/dL Final    Alkaline Phosphatase 08/07/2023 46  35 - 144 U/L Final    AST 08/07/2023 19  10 - 35 U/L Final    ALT 08/07/2023 34  9 - 46 U/L Final    Cholesterol 08/07/2023 142  <200 mg/dL Final    HDL 08/07/2023 44  > OR = 40 mg/dL Final    Triglycerides 08/07/2023 224 (H)  <150 mg/dL Final    LDL Cholesterol 08/07/2023 70  mg/dL (calc) Final    HDL/Cholesterol Ratio 08/07/2023 3.2  <5.0 (calc) Final    Non HDL Chol. (LDL+VLDL) 08/07/2023 98  <130 mg/dL (calc) Final    Hemoglobin A1C 08/07/2023 6.1 (H)  <5.7 % of total Hgb Final    TSH w/reflex to FT4 08/07/2023 1.73  0.40 - 4.50 mIU/L Final    Color, UA 08/07/2023 YELLOW  YELLOW Final    Appearance, UA 08/07/2023 CLEAR  CLEAR Final    Specific Gravity, UA 08/07/2023 1.024  1.001 - 1.035 Final    pH, UA 08/07/2023 5.5  5.0 - 8.0 Final    Glucose, UA 08/07/2023 3+ (A)  NEGATIVE Final    Bilirubin, UA 08/07/2023 NEGATIVE  NEGATIVE Final     Ketones, UA 08/07/2023 NEGATIVE  NEGATIVE Final    Occult Blood UA 08/07/2023 NEGATIVE  NEGATIVE Final    Protein, UA 08/07/2023 NEGATIVE  NEGATIVE Final    Nitrite, UA 08/07/2023 NEGATIVE  NEGATIVE Final    Leukocytes, UA 08/07/2023 NEGATIVE  NEGATIVE Final    WBC Casts, UA 08/07/2023 NONE SEEN  < OR = 5 /HPF Final    RBC Casts, UA 08/07/2023 NONE SEEN  < OR = 2 /HPF Final    Squam Epithel, UA 08/07/2023 NONE SEEN  < OR = 5 /HPF Final    Bacteria, UA 08/07/2023 NONE SEEN  NONE SEEN /HPF Final    Hyaline Casts, UA 08/07/2023 NONE SEEN  NONE SEEN /LPF Final    Service Cmt: 08/07/2023    Final    Reflexive Urine Culture 08/07/2023    Final    Creatinine, Urine 08/07/2023 125  20 - 320 mg/dL Final    Microalb, Ur 08/07/2023 0.5  See Note: mg/dL Final    Microalb/Creat Ratio 08/07/2023 4  <30 mcg/mg creat Final       Past Medical History:   Diagnosis Date    GERD (gastroesophageal reflux disease)     Hyperlipidemia     Squamous cell carcinoma of skin      Social History     Socioeconomic History    Marital status:    Tobacco Use    Smoking status: Never    Smokeless tobacco: Never   Substance and Sexual Activity    Alcohol use: Yes     Alcohol/week: 1.0 standard drink of alcohol     Types: 1 Glasses of wine per week    Drug use: No    Sexual activity: Yes     Partners: Female     Social Determinants of Health     Financial Resource Strain: Unknown (8/7/2023)    Overall Financial Resource Strain (CARDIA)     Difficulty of Paying Living Expenses: Patient refused   Food Insecurity: Unknown (8/7/2023)    Hunger Vital Sign     Worried About Running Out of Food in the Last Year: Patient refused     Ran Out of Food in the Last Year: Patient refused   Transportation Needs: No Transportation Needs (8/7/2023)    PRAPARE - Transportation     Lack of Transportation (Medical): No     Lack of Transportation (Non-Medical): No   Physical Activity: Unknown (8/7/2023)    Exercise Vital Sign     Days of Exercise per Week: Patient  refused   Stress: Unknown (8/7/2023)    [employer] [facility] - Occupational Stress Questionnaire     Feeling of Stress : Patient refused   Social Connections: Unknown (8/7/2023)    Social Connection and Isolation Panel [NHANES]     Frequency of Communication with Friends and Family: More than three times a week     Frequency of Social Gatherings with Friends and Family: Once a week     Active Member of Clubs or Organizations: Yes     Attends Club or Organization Meetings: More than 4 times per year     Marital Status:    Housing Stability: Unknown (8/7/2023)    Housing Stability Vital Sign     Unable to Pay for Housing in the Last Year: No     Unstable Housing in the Last Year: No     Past Surgical History:   Procedure Laterality Date    cataracts  2019    Dr MARYANN Martinez    COLONOSCOPY N/A 11/30/2017    Procedure: COLONOSCOPY;  Surgeon: Maikel Medina MD;  Location: [facility];  Service: Endoscopy;  Laterality: N/A;    CORONARY ARTERY BYPASS GRAFT  11/02/2020    Dr Mims- 3 vessel    EYE SURGERY  April 2019    FOOT SURGERY      HERNIA REPAIR       Family History   Problem Relation Age of Onset    Diabetes Mother     Hypertension Father        The 10-year CVD risk score (D'Agostino, et al., 2008) is: 27.9%    Values used to calculate the score:      Age: 67 years      Sex: Male      Diabetic: Yes      Tobacco smoker: No      Systolic Blood Pressure: 112 mmHg      Is BP treated: Yes      HDL Cholesterol: 44 mg/dL      Total Cholesterol: 142 mg/dL    Tests to Keep You Healthy    Eye Exam: DUE  Colon Cancer Screening: Met on 11/30/2017  Last Blood Pressure <= 139/89 (8/10/2023): Yes  Last HbA1c < 8 (08/07/2023): Yes      Review of patient's allergies indicates:  No Known Allergies    Current Outpatient Medications:     ascorbic acid, vitamin C, 500 mg Cap, ascorbic acid (vitamin C) 500 mg capsule  Take 2 capsules every day by oral route., Disp: , Rfl:     aspirin (ECOTRIN) 81 MG EC tablet, Take  81 mg by mouth once daily., Disp: , Rfl:     blood sugar diagnostic Strp, To check BG 1 times daily, to use with insurance preferred meter, Disp: 100 strip, Rfl: 0    coenzyme Q10 100 mg capsule, Co Q-10 100 mg capsule  Take 1 capsule every day by oral route., Disp: , Rfl:     dapagliflozin (FARXIGA) 5 mg Tab tablet, Take 1 tablet (5 mg total) by mouth once daily., Disp: 90 tablet, Rfl: 3    ibuprofen (ADVIL,MOTRIN) 200 MG tablet, Take 200 mg by mouth every 8 (eight) hours as needed for Pain., Disp: , Rfl:     KRILL OIL ORAL, Take by mouth once daily., Disp: , Rfl:     lancets Misc, To check BG 1 times daily, to use with insurance preferred meter, Disp: 100 each, Rfl: 1    losartan (COZAAR) 50 MG tablet, Take 1 tablet (50 mg total) by mouth once daily., Disp: 90 tablet, Rfl: 3    promethazine (PHENERGAN) 25 MG tablet, Take 25 mg by mouth., Disp: , Rfl:     rosuvastatin (CRESTOR) 20 MG tablet, Take 1 tablet (20 mg total) by mouth once daily., Disp: 90 tablet, Rfl: 2    blood-glucose meter kit, To check BG 1 times daily, to use with insurance preferred meter, Disp: 1 each, Rfl: 0    metFORMIN (GLUCOPHAGE-XR) 750 MG ER 24hr tablet, Take 1 tablet (750 mg total) by mouth 2 (two) times daily with meals., Disp: 180 tablet, Rfl: 3    omeprazole (PRILOSEC) 20 MG capsule, Take 1 capsule (20 mg total) by mouth once daily., Disp: 90 capsule, Rfl: 3    Review of Systems   Constitutional:  Negative for appetite change, chills, fatigue, fever and unexpected weight change.   HENT:  Negative for ear pain and trouble swallowing.    Eyes:  Negative for pain, discharge and visual disturbance.   Respiratory:  Negative for apnea, cough, shortness of breath and wheezing.    Cardiovascular:  Negative for chest pain and leg swelling.   Gastrointestinal:  Positive for reflux (controlled with omeprazole). Negative for abdominal pain, blood in stool, constipation, diarrhea, nausea and vomiting.   Endocrine: Negative for cold intolerance, heat  intolerance and polydipsia.   Genitourinary:  Negative for bladder incontinence, dysuria, erectile dysfunction, frequency, hematuria, testicular pain and urgency.        Nocturia 1 time per night   Musculoskeletal:  Positive for back pain. Negative for gait problem, joint swelling and myalgias.   Neurological:  Negative for dizziness, seizures and numbness.   Psychiatric/Behavioral:  Positive for sleep disturbance. Negative for agitation, behavioral problems and hallucinations. The patient is not nervous/anxious.            Objective:      Vitals:    08/10/23 0856   BP: 112/76   Pulse: 72   Weight: 103.9 kg (229 lb)   Height: 6' (1.829 m)     Physical Exam  Vitals and nursing note reviewed.   Constitutional:       General: He is not in acute distress.     Appearance: He is well-developed. He is obese. He is not toxic-appearing.   HENT:      Head: Normocephalic and atraumatic.      Right Ear: Tympanic membrane and external ear normal.      Left Ear: Tympanic membrane and external ear normal.      Nose: Nose normal.      Mouth/Throat:      Pharynx: Oropharynx is clear.   Eyes:      Pupils: Pupils are equal, round, and reactive to light.   Neck:      Thyroid: No thyromegaly.      Vascular: No carotid bruit.   Cardiovascular:      Rate and Rhythm: Normal rate and regular rhythm.      Heart sounds: Normal heart sounds. No murmur heard.  Pulmonary:      Effort: Pulmonary effort is normal.      Breath sounds: Normal breath sounds. No wheezing or rales.   Abdominal:      General: Bowel sounds are normal. There is no distension.      Palpations: Abdomen is soft.      Tenderness: There is no abdominal tenderness.   Musculoskeletal:         General: No tenderness or deformity. Normal range of motion.      Cervical back: Normal range of motion and neck supple.      Lumbar back: Normal. No spasms.      Comments: Bends 90 degrees at  waist, shoulders and knees good range of motion without crepitance.  No pitting edema to lower  extremities   Lymphadenopathy:      Cervical: No cervical adenopathy.   Skin:     General: Skin is warm and dry.      Findings: No rash.   Neurological:      Mental Status: He is alert and oriented to person, place, and time. Mental status is at baseline.      Cranial Nerves: No cranial nerve deficit.      Coordination: Coordination normal.   Psychiatric:         Mood and Affect: Mood normal.         Behavior: Behavior normal.         Thought Content: Thought content normal.         Judgment: Judgment normal.           Assessment:       1. Mixed hyperlipidemia    2. Family history of coronary artery disease    3. Status post three vessel coronary artery bypass    4. Erectile dysfunction due to arterial insufficiency    5. Type 2 diabetes mellitus without complication, without long-term current use of insulin    6. Gastroesophageal reflux disease without esophagitis    7. Arthritis of right ankle         Plan:       Mixed hyperlipidemia  Patient having significant myalgias but stable on rosuvastatin 20 mg daily.  He will discuss with his cardiologist the possibility of using Nexlizet for cholesterol as an alternative.  Cholesterol 142 HDL 44  LDL 70  Family history of coronary artery disease    Status post three vessel coronary artery bypass  No angina lately and cardiac function appears normal  Erectile dysfunction due to arterial insufficiency    Type 2 diabetes mellitus without complication, without long-term current use of insulin  A1c well controlled at 6.1 continue current medications  Gastroesophageal reflux disease without esophagitis  Controlled with omeprazole every other day  Arthritis of right ankle  Post surgical arthritis    Follow up in about 6 months (around 2/10/2024) for Diabetic Check-Up.        8/10/2023 Tyler Abel

## 2023-09-19 LAB
LEFT EYE DM RETINOPATHY: NEGATIVE
RIGHT EYE DM RETINOPATHY: NEGATIVE

## 2023-10-04 DIAGNOSIS — E78.2 MIXED HYPERLIPIDEMIA: ICD-10-CM

## 2023-10-04 RX ORDER — ROSUVASTATIN CALCIUM 20 MG/1
20 TABLET, COATED ORAL DAILY
Qty: 90 TABLET | Refills: 2 | Status: SHIPPED | OUTPATIENT
Start: 2023-10-04

## 2023-10-04 NOTE — TELEPHONE ENCOUNTER
----- Message from Susan Reardon sent at 10/4/2023  8:17 AM CDT -----  Pt needs refill on rosuvastatin   [facility]   [phone]

## 2023-11-06 ENCOUNTER — TELEPHONE (OUTPATIENT)
Dept: FAMILY MEDICINE | Facility: CLINIC | Age: 68
End: 2023-11-06

## 2023-11-06 NOTE — TELEPHONE ENCOUNTER
----- Message from Lois Platt sent at 11/6/2023 11:41 AM CST -----  Pt dropped off handicap parking form to be completed and said to call when ready to be picked up.  [phone]

## 2023-12-05 ENCOUNTER — PATIENT MESSAGE (OUTPATIENT)
Dept: FAMILY MEDICINE | Facility: CLINIC | Age: 68
End: 2023-12-05

## 2023-12-12 ENCOUNTER — PATIENT OUTREACH (OUTPATIENT)
Dept: ADMINISTRATIVE | Facility: HOSPITAL | Age: 68
End: 2023-12-12
Payer: MEDICARE

## 2023-12-12 NOTE — PROGRESS NOTES
Population Health Chart Review & Patient Outreach Details    Outreach Performed: NO    Additional Pop Health Notes:           Updates Requested / Reviewed:      Updated Care Coordination Note         Health Maintenance Topics Overdue:    Health Maintenance Due   Topic Date Due    Hepatitis C Screening  Never done    RSV Vaccine (Age 60+ and Pregnant patients) (1 - 1-dose 60+ series) Never done    Eye Exam  08/09/2023    COVID-19 Vaccine (7 - 2023-24 season) 09/01/2023    Foot Exam  02/08/2024         Health Maintenance Topic(s) Outreach Outcomes & Actions Taken:    Eye Exam - Outreach Outcomes & Actions Taken  : Diabetic Eye External Records Uploaded, Care Team & History Updated if Applicable

## 2024-01-19 DIAGNOSIS — I10 BENIGN ESSENTIAL HTN: ICD-10-CM

## 2024-01-19 RX ORDER — LOSARTAN POTASSIUM 50 MG/1
50 TABLET ORAL DAILY
Qty: 90 TABLET | Refills: 3 | Status: SHIPPED | OUTPATIENT
Start: 2024-01-19

## 2024-02-08 DIAGNOSIS — E11.9 TYPE 2 DIABETES MELLITUS WITHOUT COMPLICATION, WITHOUT LONG-TERM CURRENT USE OF INSULIN: ICD-10-CM

## 2024-02-08 RX ORDER — DAPAGLIFLOZIN 5 MG/1
5 TABLET, FILM COATED ORAL DAILY
Qty: 90 TABLET | Refills: 3 | Status: SHIPPED | OUTPATIENT
Start: 2024-02-08

## 2024-02-12 ENCOUNTER — TELEPHONE (OUTPATIENT)
Dept: FAMILY MEDICINE | Facility: CLINIC | Age: 69
End: 2024-02-12
Payer: MEDICARE

## 2024-02-20 LAB
ALBUMIN SERPL-MCNC: 4.3 G/DL (ref 3.6–5.1)
ALBUMIN/CREAT UR: 3 MCG/MG CREAT
ALBUMIN/GLOB SERPL: 1.6 (CALC) (ref 1–2.5)
ALP SERPL-CCNC: 50 U/L (ref 35–144)
ALT SERPL-CCNC: 33 U/L (ref 9–46)
APPEARANCE UR: CLEAR
AST SERPL-CCNC: 22 U/L (ref 10–35)
BACTERIA #/AREA URNS HPF: ABNORMAL /HPF
BACTERIA UR CULT: ABNORMAL
BASOPHILS # BLD AUTO: 39 CELLS/UL (ref 0–200)
BASOPHILS NFR BLD AUTO: 0.7 %
BILIRUB SERPL-MCNC: 0.7 MG/DL (ref 0.2–1.2)
BILIRUB UR QL STRIP: NEGATIVE
BUN SERPL-MCNC: 12 MG/DL (ref 7–25)
BUN/CREAT SERPL: ABNORMAL (CALC) (ref 6–22)
CALCIUM SERPL-MCNC: 9.1 MG/DL (ref 8.6–10.3)
CHLORIDE SERPL-SCNC: 101 MMOL/L (ref 98–110)
CHOLEST SERPL-MCNC: 138 MG/DL
CHOLEST/HDLC SERPL: 3.3 (CALC)
CO2 SERPL-SCNC: 29 MMOL/L (ref 20–32)
COLOR UR: YELLOW
CREAT SERPL-MCNC: 0.83 MG/DL (ref 0.7–1.28)
CREAT UR-MCNC: 168 MG/DL (ref 20–320)
EGFR: 94 ML/MIN/1.73M2
EOSINOPHIL # BLD AUTO: 230 CELLS/UL (ref 15–500)
EOSINOPHIL NFR BLD AUTO: 4.1 %
ERYTHROCYTE [DISTWIDTH] IN BLOOD BY AUTOMATED COUNT: 12.7 % (ref 11–15)
GLOBULIN SER CALC-MCNC: 2.7 G/DL (CALC) (ref 1.9–3.7)
GLUCOSE SERPL-MCNC: 140 MG/DL (ref 65–99)
GLUCOSE UR QL STRIP: ABNORMAL
HBA1C MFR BLD: 6.5 % OF TOTAL HGB
HCT VFR BLD AUTO: 47.9 % (ref 38.5–50)
HDLC SERPL-MCNC: 42 MG/DL
HGB BLD-MCNC: 16.2 G/DL (ref 13.2–17.1)
HGB UR QL STRIP: NEGATIVE
HYALINE CASTS #/AREA URNS LPF: ABNORMAL /LPF
KETONES UR QL STRIP: NEGATIVE
LDLC SERPL CALC-MCNC: 69 MG/DL (CALC)
LEUKOCYTE ESTERASE UR QL STRIP: NEGATIVE
LYMPHOCYTES # BLD AUTO: 2094 CELLS/UL (ref 850–3900)
LYMPHOCYTES NFR BLD AUTO: 37.4 %
MCH RBC QN AUTO: 30.5 PG (ref 27–33)
MCHC RBC AUTO-ENTMCNC: 33.8 G/DL (ref 32–36)
MCV RBC AUTO: 90.2 FL (ref 80–100)
MICROALBUMIN UR-MCNC: 0.5 MG/DL
MONOCYTES # BLD AUTO: 538 CELLS/UL (ref 200–950)
MONOCYTES NFR BLD AUTO: 9.6 %
NEUTROPHILS # BLD AUTO: 2699 CELLS/UL (ref 1500–7800)
NEUTROPHILS NFR BLD AUTO: 48.2 %
NITRITE UR QL STRIP: NEGATIVE
NONHDLC SERPL-MCNC: 96 MG/DL (CALC)
PH UR STRIP: 5.5 [PH] (ref 5–8)
PLATELET # BLD AUTO: 195 THOUSAND/UL (ref 140–400)
PMV BLD REES-ECKER: 10.3 FL (ref 7.5–12.5)
POTASSIUM SERPL-SCNC: 4.1 MMOL/L (ref 3.5–5.3)
PROT SERPL-MCNC: 7 G/DL (ref 6.1–8.1)
PROT UR QL STRIP: NEGATIVE
PSA SERPL-MCNC: 0.58 NG/ML
RBC # BLD AUTO: 5.31 MILLION/UL (ref 4.2–5.8)
RBC #/AREA URNS HPF: ABNORMAL /HPF
SERVICE CMNT-IMP: ABNORMAL
SODIUM SERPL-SCNC: 140 MMOL/L (ref 135–146)
SP GR UR STRIP: 1.02 (ref 1–1.03)
SQUAMOUS #/AREA URNS HPF: ABNORMAL /HPF
TRIGL SERPL-MCNC: 200 MG/DL
TSH SERPL-ACNC: 2.51 MIU/L (ref 0.4–4.5)
WBC # BLD AUTO: 5.6 THOUSAND/UL (ref 3.8–10.8)
WBC #/AREA URNS HPF: ABNORMAL /HPF

## 2024-02-26 ENCOUNTER — OFFICE VISIT (OUTPATIENT)
Dept: FAMILY MEDICINE | Facility: CLINIC | Age: 69
End: 2024-02-26
Attending: FAMILY MEDICINE
Payer: MEDICARE

## 2024-02-26 VITALS
WEIGHT: 230 LBS | SYSTOLIC BLOOD PRESSURE: 120 MMHG | HEART RATE: 80 BPM | BODY MASS INDEX: 31.15 KG/M2 | HEIGHT: 72 IN | DIASTOLIC BLOOD PRESSURE: 76 MMHG

## 2024-02-26 DIAGNOSIS — M72.2 PLANTAR FASCIITIS OF LEFT FOOT: ICD-10-CM

## 2024-02-26 DIAGNOSIS — N52.01 ERECTILE DYSFUNCTION DUE TO ARTERIAL INSUFFICIENCY: ICD-10-CM

## 2024-02-26 DIAGNOSIS — E78.2 MIXED HYPERLIPIDEMIA: ICD-10-CM

## 2024-02-26 DIAGNOSIS — Z82.49 FAMILY HISTORY OF CORONARY ARTERY DISEASE: ICD-10-CM

## 2024-02-26 DIAGNOSIS — M19.071 ARTHRITIS OF RIGHT ANKLE: ICD-10-CM

## 2024-02-26 DIAGNOSIS — J01.40 ACUTE NON-RECURRENT PANSINUSITIS: ICD-10-CM

## 2024-02-26 DIAGNOSIS — Z95.1 STATUS POST THREE VESSEL CORONARY ARTERY BYPASS: ICD-10-CM

## 2024-02-26 DIAGNOSIS — K21.9 GASTROESOPHAGEAL REFLUX DISEASE WITHOUT ESOPHAGITIS: ICD-10-CM

## 2024-02-26 DIAGNOSIS — E11.9 TYPE 2 DIABETES MELLITUS WITHOUT COMPLICATION, WITHOUT LONG-TERM CURRENT USE OF INSULIN: Primary | ICD-10-CM

## 2024-02-26 DIAGNOSIS — E11.9 TYPE 2 DIABETES MELLITUS WITHOUT COMPLICATION, WITHOUT LONG-TERM CURRENT USE OF INSULIN: ICD-10-CM

## 2024-02-26 PROCEDURE — 4010F ACE/ARB THERAPY RXD/TAKEN: CPT | Mod: CPTII,S$GLB,, | Performed by: FAMILY MEDICINE

## 2024-02-26 PROCEDURE — 1159F MED LIST DOCD IN RCRD: CPT | Mod: CPTII,S$GLB,, | Performed by: FAMILY MEDICINE

## 2024-02-26 PROCEDURE — 3288F FALL RISK ASSESSMENT DOCD: CPT | Mod: CPTII,S$GLB,, | Performed by: FAMILY MEDICINE

## 2024-02-26 PROCEDURE — 3066F NEPHROPATHY DOC TX: CPT | Mod: CPTII,S$GLB,, | Performed by: FAMILY MEDICINE

## 2024-02-26 PROCEDURE — 1101F PT FALLS ASSESS-DOCD LE1/YR: CPT | Mod: CPTII,S$GLB,, | Performed by: FAMILY MEDICINE

## 2024-02-26 PROCEDURE — 99214 OFFICE O/P EST MOD 30 MIN: CPT | Mod: S$GLB,,, | Performed by: FAMILY MEDICINE

## 2024-02-26 PROCEDURE — 3074F SYST BP LT 130 MM HG: CPT | Mod: CPTII,S$GLB,, | Performed by: FAMILY MEDICINE

## 2024-02-26 PROCEDURE — 3061F NEG MICROALBUMINURIA REV: CPT | Mod: CPTII,S$GLB,, | Performed by: FAMILY MEDICINE

## 2024-02-26 PROCEDURE — 3078F DIAST BP <80 MM HG: CPT | Mod: CPTII,S$GLB,, | Performed by: FAMILY MEDICINE

## 2024-02-26 PROCEDURE — 3044F HG A1C LEVEL LT 7.0%: CPT | Mod: CPTII,S$GLB,, | Performed by: FAMILY MEDICINE

## 2024-02-26 PROCEDURE — 3008F BODY MASS INDEX DOCD: CPT | Mod: CPTII,S$GLB,, | Performed by: FAMILY MEDICINE

## 2024-02-26 RX ORDER — AMOXICILLIN 500 MG/1
500 CAPSULE ORAL 3 TIMES DAILY
Qty: 30 CAPSULE | Refills: 0 | Status: SHIPPED | OUTPATIENT
Start: 2024-02-26

## 2024-02-26 RX ORDER — OMEPRAZOLE 20 MG/1
20 CAPSULE, DELAYED RELEASE ORAL DAILY
Qty: 90 CAPSULE | Refills: 3 | Status: SHIPPED | OUTPATIENT
Start: 2024-02-26 | End: 2024-08-24

## 2024-02-26 RX ORDER — METFORMIN HYDROCHLORIDE 750 MG/1
750 TABLET, EXTENDED RELEASE ORAL 2 TIMES DAILY WITH MEALS
Qty: 180 TABLET | Refills: 3 | Status: SHIPPED | OUTPATIENT
Start: 2024-02-26 | End: 2024-08-24

## 2024-02-26 NOTE — PROGRESS NOTES
SUBJECTIVE:    Patient ID: Jacob Lou is a 68 y.o. male.    Chief Complaint: Diabetes (No bottles, review Lab-results, sinus, Foot exam ordered, abc )    This 68-year-old male with diabetes is here for a six-month checkup.  Complains of a recent upper respiratory infection with mostly sinus congestion.  No significant cough fever chills nausea vomiting or diarrhea.    He is a nonsmoker and occasionally drinks a beer or mixed drinks on the weekend.    Complains of 5 weeks of left heel pain, has been using tennis shoes and ibuprofen for comfort.  He does not walk barefoot.    Diabetes type 2-stable on Farxiga and metformin A1c today 6.5.    Three-vessel CABG-followed by Dr. Ramos-recent stress testing was normal.  No angina reported.    Patient exercises walking 10,000 steps per day on the weekends and 6-7000 steps per day on the week days.    2023-colonoscopy with Dr. Medina-    JEANIE-, compliant with CPAP    Ophthalmology-sees Dr. MARYANN Martinez yearly, no retinopathy noted.    Up-to-date on Shingrix vaccine and flu vaccine    Diabetes  Pertinent negatives for hypoglycemia include no confusion or headaches. Pertinent negatives for diabetes include no chest pain, no polydipsia, no polyuria and no weakness.       Patient Outreach on 12/12/2023   Component Date Value Ref Range Status    Left Eye DM Retinopathy 09/19/2023 Negative   Final    Right Eye DM Retinopathy 09/19/2023 Negative   Final       Past Medical History:   Diagnosis Date    GERD (gastroesophageal reflux disease)     Hyperlipidemia     Squamous cell carcinoma of skin      Social History     Socioeconomic History    Marital status:    Tobacco Use    Smoking status: Never    Smokeless tobacco: Never   Substance and Sexual Activity    Alcohol use: Yes     Alcohol/week: 1.0 standard drink of alcohol     Types: 1 Glasses of wine per week    Drug use: No    Sexual activity: Yes     Partners: Female     Social Determinants of Health     Financial Resource  Strain: Patient Declined (2/20/2024)    Overall Financial Resource Strain (CARDIA)     Difficulty of Paying Living Expenses: Patient declined   Food Insecurity: Patient Declined (2/20/2024)    Hunger Vital Sign     Worried About Running Out of Food in the Last Year: Patient declined     Ran Out of Food in the Last Year: Patient declined   Transportation Needs: Patient Declined (2/20/2024)    PRAPARE - Transportation     Lack of Transportation (Medical): Patient declined     Lack of Transportation (Non-Medical): Patient declined   Physical Activity: Insufficiently Active (2/20/2024)    Exercise Vital Sign     Days of Exercise per Week: 4 days     Minutes of Exercise per Session: 20 min   Stress: No Stress Concern Present (2/20/2024)    [employer] - Occupational Stress Questionnaire     Feeling of Stress : Only a little   Social Connections: Unknown (2/20/2024)    Social Connection and Isolation Panel [NHANES]     Frequency of Communication with Friends and Family: Three times a week     Frequency of Social Gatherings with Friends and Family: Twice a week     Active Member of Clubs or Organizations: Yes     Attends Club or Organization Meetings: Patient declined     Marital Status:    Housing Stability: Patient Declined (2/20/2024)    Housing Stability Vital Sign     Unable to Pay for Housing in the Last Year: Patient declined     Unstable Housing in the Last Year: Patient declined     Past Surgical History:   Procedure Laterality Date    cataracts  2019    Dr MARYANN Martinez    COLONOSCOPY N/A 11/30/2017    Procedure: COLONOSCOPY;  Surgeon: Maikel Medina MD;  [facility]  Service: [facility]oscopy;  Laterality: N/A;    CORONARY ARTERY BYPASS GRAFT  11/02/2020    Dr Mims- 3 vessel    EYE SURGERY  April 2019    FOOT SURGERY      HERNIA REPAIR       Family History   Problem Relation Age of Onset    Diabetes Mother     Hypertension Father        The 10-year CVD risk score (Barrera  et al., 2008) is: 32.8%    Values used to calculate the score:      Age: 68 years      Sex: Male      Diabetic: Yes      Tobacco smoker: No      Systolic Blood Pressure: 120 mmHg      Is BP treated: Yes      HDL Cholesterol: 42 mg/dL      Total Cholesterol: 138 mg/dL    All of your core healthy metrics are met.      Review of patient's allergies indicates:  No Known Allergies    Current Outpatient Medications:     ascorbic acid, vitamin C, 500 mg Cap, ascorbic acid (vitamin C) 500 mg capsule  Take 2 capsules every day by oral route., Disp: , Rfl:     aspirin (ECOTRIN) 81 MG EC tablet, Take 81 mg by mouth once daily., Disp: , Rfl:     blood sugar diagnostic Strp, To check BG 1 times daily, to use with insurance preferred meter, Disp: 100 strip, Rfl: 0    coenzyme Q10 100 mg capsule, Co Q-10 100 mg capsule  Take 1 capsule every day by oral route., Disp: , Rfl:     dapagliflozin propanediol (FARXIGA) 5 mg Tab tablet, Take 1 tablet (5 mg total) by mouth once daily., Disp: 90 tablet, Rfl: 3    ibuprofen (ADVIL,MOTRIN) 200 MG tablet, Take 200 mg by mouth every 8 (eight) hours as needed for Pain., Disp: , Rfl:     KRILL OIL ORAL, Take by mouth once daily., Disp: , Rfl:     lancets Misc, To check BG 1 times daily, to use with insurance preferred meter, Disp: 100 each, Rfl: 1    losartan (COZAAR) 50 MG tablet, Take 1 tablet (50 mg total) by mouth once daily., Disp: 90 tablet, Rfl: 3    promethazine (PHENERGAN) 25 MG tablet, Take 25 mg by mouth., Disp: , Rfl:     rosuvastatin (CRESTOR) 20 MG tablet, Take 1 tablet (20 mg total) by mouth once daily., Disp: 90 tablet, Rfl: 2    amoxicillin (AMOXIL) 500 MG capsule, Take 1 capsule (500 mg total) by mouth 3 (three) times daily., Disp: 30 capsule, Rfl: 0    blood-glucose meter kit, To check BG 1 times daily, to use with insurance preferred meter, Disp: 1 each, Rfl: 0    metFORMIN (GLUCOPHAGE-XR) 750 MG ER 24hr tablet, Take 1 tablet (750 mg total) by mouth 2 (two) times daily with  meals., Disp: 180 tablet, Rfl: 3    omeprazole (PRILOSEC) 20 MG capsule, Take 1 capsule (20 mg total) by mouth once daily., Disp: 90 capsule, Rfl: 3    Review of Systems   Constitutional:  Negative for activity change and unexpected weight change.   HENT:  Negative for hearing loss, rhinorrhea and trouble swallowing.    Eyes:  Negative for discharge and visual disturbance.   Respiratory:  Negative for chest tightness and wheezing.    Cardiovascular:  Negative for chest pain and palpitations.   Gastrointestinal:  Positive for diarrhea. Negative for blood in stool, constipation and vomiting.   Endocrine: Negative for polydipsia and polyuria.   Genitourinary:  Negative for difficulty urinating, hematuria and urgency.   Musculoskeletal:  Positive for arthralgias. Negative for joint swelling and neck pain.   Neurological:  Negative for weakness and headaches.   Psychiatric/Behavioral:  Negative for confusion and dysphoric mood.            Objective:      Vitals:    02/26/24 0740   BP: 120/76   Pulse: 80   Weight: 104.3 kg (230 lb)   Height: 6' (1.829 m)     Physical Exam  Vitals and nursing note reviewed.   Constitutional:       General: He is not in acute distress.     Appearance: He is well-developed. He is obese. He is not toxic-appearing.   HENT:      Head: Normocephalic and atraumatic.      Right Ear: Tympanic membrane and external ear normal.      Left Ear: Tympanic membrane and external ear normal.      Nose: Nose normal.      Mouth/Throat:      Pharynx: Oropharynx is clear.   Eyes:      Pupils: Pupils are equal, round, and reactive to light.   Neck:      Thyroid: No thyromegaly.      Vascular: No carotid bruit.   Cardiovascular:      Rate and Rhythm: Normal rate and regular rhythm.      Heart sounds: Normal heart sounds. No murmur heard.  Pulmonary:      Effort: Pulmonary effort is normal.      Breath sounds: Normal breath sounds. No wheezing or rales.   Abdominal:      General: Bowel sounds are normal. There is  no distension.      Palpations: Abdomen is soft.      Tenderness: There is no abdominal tenderness.   Musculoskeletal:         General: No tenderness or deformity. Normal range of motion.      Cervical back: Normal range of motion and neck supple.      Lumbar back: Normal. No spasms.      Comments: Bends 90 degrees at  waist, shoulders and knees good range of motion without crepitance.  No pitting edema to lower extremities.Tender tender to the pad of the left heel    Lymphadenopathy:      Cervical: No cervical adenopathy.   Skin:     General: Skin is warm and dry.      Findings: No rash.   Neurological:      Mental Status: He is alert and oriented to person, place, and time.      Cranial Nerves: No cranial nerve deficit.      Coordination: Coordination normal.      Gait: Gait normal.   Psychiatric:         Mood and Affect: Mood normal.         Behavior: Behavior normal.         Thought Content: Thought content normal.         Judgment: Judgment normal.           Assessment:       1. Type 2 diabetes mellitus without complication, without long-term current use of insulin    2. Type 2 diabetes mellitus without complication, without long-term current use of insulin    3. Gastroesophageal reflux disease without esophagitis    4. Acute non-recurrent pansinusitis    5. Status post three vessel coronary artery bypass    6. Mixed hyperlipidemia    7. Family history of coronary artery disease    8. Erectile dysfunction due to arterial insufficiency    9. Arthritis of right ankle    10. Plantar fasciitis of left foot         Plan:       Type 2 diabetes mellitus without complication, without long-term current use of insulin  -     CBC Auto Differential; Future; Expected date: 02/26/2024  -     Comprehensive Metabolic Panel; Future; Expected date: 02/26/2024  -     Hemoglobin A1C; Future; Expected date: 02/26/2024  -     Lipid Panel; Future; Expected date: 02/26/2024  -     metFORMIN (GLUCOPHAGE-XR) 750 MG ER 24hr tablet; Take  1 tablet (750 mg total) by mouth 2 (two) times daily with meals.  Dispense: 180 tablet; Refill: 3  A1c stable at 6.5, continue current medications  Type 2 diabetes mellitus without complication, without long-term current use of insulin    Orders:  -     CBC Auto Differential; Future; Expected date: 02/26/2024  -     Comprehensive Metabolic Panel; Future; Expected date: 02/26/2024  -     Hemoglobin A1C; Future; Expected date: 02/26/2024  -     Lipid Panel; Future; Expected date: 02/26/2024  -     metFORMIN (GLUCOPHAGE-XR) 750 MG ER 24hr tablet; Take 1 tablet (750 mg total) by mouth 2 (two) times daily with meals.  Dispense: 180 tablet; Refill: 3    Gastroesophageal reflux disease without esophagitis  Comments:  Doing well with omeprazole. continue as is.  Orders:  -     omeprazole (PRILOSEC) 20 MG capsule; Take 1 capsule (20 mg total) by mouth once daily.  Dispense: 90 capsule; Refill: 3    Acute non-recurrent pansinusitis  -     amoxicillin (AMOXIL) 500 MG capsule; Take 1 capsule (500 mg total) by mouth 3 (three) times daily.  Dispense: 30 capsule; Refill: 0    Status post three vessel coronary artery bypass  No angina recently  Mixed hyperlipidemia  Cholesterol 138 HDL 42  LDL 69, increase Krill oil 250 mg b.i.d. for better triglycerides reduction.  Family history of coronary artery disease    Erectile dysfunction due to arterial insufficiency    Arthritis of right ankle  Has screws and plates in the right ankle from reconstruction surgery  Plantar fasciitis of left foot  Plantar fasciitis heel stretches recommended, continue tennis shoes.    Follow up in about 6 months (around 8/26/2024) for Diabetic Check-Up.        2/26/2024 Tyler Abel

## 2024-07-08 DIAGNOSIS — E78.2 MIXED HYPERLIPIDEMIA: ICD-10-CM

## 2024-07-08 RX ORDER — ROSUVASTATIN CALCIUM 20 MG/1
20 TABLET, COATED ORAL DAILY
Qty: 90 TABLET | Refills: 2 | Status: SHIPPED | OUTPATIENT
Start: 2024-07-08

## 2024-08-14 ENCOUNTER — TELEPHONE (OUTPATIENT)
Dept: FAMILY MEDICINE | Facility: CLINIC | Age: 69
End: 2024-08-14
Payer: MEDICARE

## 2024-08-27 ENCOUNTER — OFFICE VISIT (OUTPATIENT)
Dept: FAMILY MEDICINE | Facility: CLINIC | Age: 69
End: 2024-08-27
Payer: MEDICARE

## 2024-08-27 VITALS
SYSTOLIC BLOOD PRESSURE: 98 MMHG | HEIGHT: 72 IN | HEART RATE: 83 BPM | DIASTOLIC BLOOD PRESSURE: 66 MMHG | BODY MASS INDEX: 30.61 KG/M2 | WEIGHT: 226 LBS

## 2024-08-27 DIAGNOSIS — E78.2 MIXED HYPERLIPIDEMIA: ICD-10-CM

## 2024-08-27 DIAGNOSIS — Z95.1 STATUS POST THREE VESSEL CORONARY ARTERY BYPASS: ICD-10-CM

## 2024-08-27 DIAGNOSIS — K21.9 GASTROESOPHAGEAL REFLUX DISEASE WITHOUT ESOPHAGITIS: ICD-10-CM

## 2024-08-27 DIAGNOSIS — M19.071 ARTHRITIS OF RIGHT ANKLE: ICD-10-CM

## 2024-08-27 DIAGNOSIS — M72.2 PLANTAR FASCIITIS OF LEFT FOOT: ICD-10-CM

## 2024-08-27 DIAGNOSIS — K52.9 GASTROENTERITIS: ICD-10-CM

## 2024-08-27 DIAGNOSIS — Z12.5 SPECIAL SCREENING FOR MALIGNANT NEOPLASM OF PROSTATE: ICD-10-CM

## 2024-08-27 DIAGNOSIS — L57.0 ACTINIC KERATOSES: ICD-10-CM

## 2024-08-27 DIAGNOSIS — N52.01 ERECTILE DYSFUNCTION DUE TO ARTERIAL INSUFFICIENCY: ICD-10-CM

## 2024-08-27 DIAGNOSIS — E11.9 TYPE 2 DIABETES MELLITUS WITHOUT COMPLICATION, WITHOUT LONG-TERM CURRENT USE OF INSULIN: Primary | ICD-10-CM

## 2024-08-27 DIAGNOSIS — Z82.49 FAMILY HISTORY OF CORONARY ARTERY DISEASE: ICD-10-CM

## 2024-08-27 PROCEDURE — 1159F MED LIST DOCD IN RCRD: CPT | Mod: CPTII,S$GLB,, | Performed by: FAMILY MEDICINE

## 2024-08-27 PROCEDURE — 3008F BODY MASS INDEX DOCD: CPT | Mod: CPTII,S$GLB,, | Performed by: FAMILY MEDICINE

## 2024-08-27 PROCEDURE — 3074F SYST BP LT 130 MM HG: CPT | Mod: CPTII,S$GLB,, | Performed by: FAMILY MEDICINE

## 2024-08-27 PROCEDURE — 3288F FALL RISK ASSESSMENT DOCD: CPT | Mod: CPTII,S$GLB,, | Performed by: FAMILY MEDICINE

## 2024-08-27 PROCEDURE — 17000 DESTRUCT PREMALG LESION: CPT | Mod: S$GLB,,, | Performed by: FAMILY MEDICINE

## 2024-08-27 PROCEDURE — 3061F NEG MICROALBUMINURIA REV: CPT | Mod: CPTII,S$GLB,, | Performed by: FAMILY MEDICINE

## 2024-08-27 PROCEDURE — 3078F DIAST BP <80 MM HG: CPT | Mod: CPTII,S$GLB,, | Performed by: FAMILY MEDICINE

## 2024-08-27 PROCEDURE — 4010F ACE/ARB THERAPY RXD/TAKEN: CPT | Mod: CPTII,S$GLB,, | Performed by: FAMILY MEDICINE

## 2024-08-27 PROCEDURE — 1101F PT FALLS ASSESS-DOCD LE1/YR: CPT | Mod: CPTII,S$GLB,, | Performed by: FAMILY MEDICINE

## 2024-08-27 PROCEDURE — 99214 OFFICE O/P EST MOD 30 MIN: CPT | Mod: 25,S$GLB,, | Performed by: FAMILY MEDICINE

## 2024-08-27 PROCEDURE — 3044F HG A1C LEVEL LT 7.0%: CPT | Mod: CPTII,S$GLB,, | Performed by: FAMILY MEDICINE

## 2024-08-27 PROCEDURE — 3066F NEPHROPATHY DOC TX: CPT | Mod: CPTII,S$GLB,, | Performed by: FAMILY MEDICINE

## 2024-08-27 NOTE — PROCEDURES
"Destruction, Premalignant Lesion    Date/Time: 8/27/2024 8:20 AM    Performed by: Tyler Abel MD  Authorized by: Tyler Abel MD    Consent Done?:  Yes (Verbal)  Time out: Immediately prior to procedure a "time out" was called to verify the correct patient, procedure, equipment, support staff and site/side marked as required.      Indications:  Actinic keratosis    Location(s):  Head/Neck:  Cheek        Detail: left cheek  Preparation: Patient was prepped and draped in usual sterile fashion    Number of lesions:  1  Destruction method:  Cryotherapy   Patient tolerated the procedure well with no immediate complications.    "

## 2024-08-27 NOTE — PROGRESS NOTES
Subjective:       Patient ID: Jacob Lou is a 68 y.o. male.    Chief Complaint: Diabetes (No bottles, foot exam ordered, upcoming appt for Eye exam Dr Peterson 9/20/24, review Lab-results, Diarrhea and Vomiting this morning, abc )    This is 68-year-old male with diabetes.  He has some gastroenteritis symptoms that started last night with diarrhea chills and some nausea vomiting this morning.  He ate red beans and rice for dinner last night.    He is a nonsmoker and occasionally drinks a beer on the weekends    CABG three-vessel, November 20, 2020-sees Dr. Hill cardiology ever six-months.  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    Hyperlipidemia, on rosuvastatin and Krill oil.  Triglycerides remain rather elevated.    Plantar fasciitis is resolving on both feet.  No longer on NSAIDs.    Diabetes type 2 A1c is down to 6.7.  Controlled well with Farxiga and metformin    Has a scaly lesion on his left cheek that he cuts when he is shaving    Diabetes  Pertinent negatives for hypoglycemia include no confusion or headaches. Pertinent negatives for diabetes include no chest pain, no polydipsia, no polyuria and no weakness.       Past Medical History:   Diagnosis Date    GERD (gastroesophageal reflux disease)     Hyperlipidemia     Squamous cell carcinoma of skin      Past Surgical History:   Procedure Laterality Date    cataracts  2019    Dr MARYANN Martinez    COLONOSCOPY N/A 11/30/2017    Procedure: COLONOSCOPY;  Surgeon: Maikel Medina MD;  Location: [facility];  Service: Endoscopy;  Laterality: N/A;    CORONARY ARTERY BYPASS GRAFT  11/02/2020    Dr Mims- 3 vessel    EYE SURGERY  April 2019    FOOT SURGERY      HERNIA REPAIR       Family History   Problem Relation Name Age of Onset    Diabetes Mother KAN Rodríguez     Hypertension Father         Marital Status:   Alcohol History:  reports current alcohol use of about 1.0 standard drink of alcohol per week.  Tobacco History:  reports that he has never  smoked. He has never used smokeless tobacco.  Drug History:  reports no history of drug use.    Review of patient's allergies indicates:  No Known Allergies    Current Outpatient Medications:     ascorbic acid, vitamin C, 500 mg Cap, ascorbic acid (vitamin C) 500 mg capsule  Take 2 capsules every day by oral route., Disp: , Rfl:     aspirin (ECOTRIN) 81 MG EC tablet, Take 81 mg by mouth once daily., Disp: , Rfl:     blood sugar diagnostic Strp, To check BG 1 times daily, to use with insurance preferred meter, Disp: 100 strip, Rfl: 0    coenzyme Q10 100 mg capsule, Co Q-10 100 mg capsule  Take 1 capsule every day by oral route., Disp: , Rfl:     dapagliflozin propanediol (FARXIGA) 5 mg Tab tablet, Take 1 tablet (5 mg total) by mouth once daily., Disp: 90 tablet, Rfl: 3    ibuprofen (ADVIL,MOTRIN) 200 MG tablet, Take 200 mg by mouth every 8 (eight) hours as needed for Pain., Disp: , Rfl:     KRILL OIL ORAL, Take by mouth once daily., Disp: , Rfl:     lancets Misc, To check BG 1 times daily, to use with insurance preferred meter, Disp: 100 each, Rfl: 1    losartan (COZAAR) 50 MG tablet, Take 1 tablet (50 mg total) by mouth once daily., Disp: 90 tablet, Rfl: 3    promethazine (PHENERGAN) 25 MG tablet, Take 25 mg by mouth., Disp: , Rfl:     rosuvastatin (CRESTOR) 20 MG tablet, Take 1 tablet (20 mg total) by mouth once daily., Disp: 90 tablet, Rfl: 2    amoxicillin (AMOXIL) 500 MG capsule, Take 1 capsule (500 mg total) by mouth 3 (three) times daily. (Patient not taking: Reported on 8/27/2024), Disp: 30 capsule, Rfl: 0    blood-glucose meter kit, To check BG 1 times daily, to use with insurance preferred meter, Disp: 1 each, Rfl: 0    metFORMIN (GLUCOPHAGE-XR) 750 MG ER 24hr tablet, Take 1 tablet (750 mg total) by mouth 2 (two) times daily with meals., Disp: 180 tablet, Rfl: 3    omeprazole (PRILOSEC) 20 MG capsule, Take 1 capsule (20 mg total) by mouth once daily., Disp: 90 capsule, Rfl: 3    Review of Systems    Constitutional:  Negative for activity change and unexpected weight change.   HENT:  Negative for hearing loss, rhinorrhea and trouble swallowing.    Eyes:  Negative for discharge and visual disturbance.   Respiratory:  Negative for chest tightness and wheezing.    Cardiovascular:  Negative for chest pain and palpitations.   Gastrointestinal:  Negative for blood in stool, constipation, diarrhea and vomiting.   Endocrine: Negative for polydipsia and polyuria.   Genitourinary:  Negative for difficulty urinating, hematuria and urgency.   Musculoskeletal:  Positive for arthralgias. Negative for joint swelling and neck pain.   Neurological:  Negative for weakness and headaches.   Psychiatric/Behavioral:  Negative for confusion and dysphoric mood.            Objective:      Vitals:    08/27/24 0828   BP: 98/66   Pulse: 83   Weight: 102.5 kg (226 lb)   Height: 6' (1.829 m)     Body mass index is 30.65 kg/m².  Physical Exam  Vitals and nursing note reviewed.   Constitutional:       General: He is not in acute distress.     Appearance: He is well-developed. He is obese. He is not toxic-appearing.   HENT:      Head: Normocephalic and atraumatic.      Right Ear: Tympanic membrane and external ear normal.      Left Ear: Tympanic membrane and external ear normal.      Nose: Nose normal.      Mouth/Throat:      Pharynx: Oropharynx is clear.   Eyes:      Pupils: Pupils are equal, round, and reactive to light.   Neck:      Thyroid: No thyromegaly.      Vascular: No carotid bruit.   Cardiovascular:      Rate and Rhythm: Normal rate and regular rhythm.      Heart sounds: Normal heart sounds. No murmur heard.  Pulmonary:      Effort: Pulmonary effort is normal.      Breath sounds: Normal breath sounds. No wheezing or rales.   Abdominal:      General: Bowel sounds are normal. There is no distension.      Palpations: Abdomen is soft.      Tenderness: There is no abdominal tenderness.   Musculoskeletal:         General: No tenderness  or deformity. Normal range of motion.      Cervical back: Normal range of motion and neck supple.      Lumbar back: Normal. No spasms.      Comments: Bends 90 degrees at  waist shoulders and knees good range of motion without crepitance.  No pitting edema to lower extremities   Feet:      Right foot:      Protective Sensation: 5 sites tested.  5 sites sensed.      Skin integrity: Callus present.      Left foot:      Protective Sensation: 5 sites tested.  5 sites sensed.      Skin integrity: Callus present.   Lymphadenopathy:      Cervical: No cervical adenopathy.   Skin:     General: Skin is warm and dry.      Findings: No rash.      Comments: Has numerous excoriations on his lower legs., left cheek has an actinic keratoses   Neurological:      Mental Status: He is alert and oriented to person, place, and time.      Cranial Nerves: No cranial nerve deficit.      Coordination: Coordination normal.   Psychiatric:         Mood and Affect: Mood normal.         Behavior: Behavior normal.         Thought Content: Thought content normal.         Judgment: Judgment normal.           LEFT: normal DP and PT pulses, no trophic changes or ulcerative lesions, normal sensory exam, and normal monofilament exam    RIGHT: normal DP and PT pulses, no trophic changes or ulcerative lesions, normal sensory exam, and normal monofilament exam    Assessment:       1. Type 2 diabetes mellitus without complication, without long-term current use of insulin    2. Status post three vessel coronary artery bypass    3. Mixed hyperlipidemia    4. Family history of coronary artery disease    5. Erectile dysfunction due to arterial insufficiency    6. Gastroesophageal reflux disease without esophagitis    7. Plantar fasciitis of left foot    8. Arthritis of right ankle    9. Gastroenteritis    10. Actinic keratoses    11. Special screening for malignant neoplasm of prostate        Plan:       Type 2 diabetes mellitus without complication, without  long-term current use of insulin  A1c has improved down to 6.7, continue Farxiga and metformin  Status post three vessel coronary artery bypass  Doing well from a cardiac standpoint  Mixed hyperlipidemia  Cholesterol 148 HDL 49  mildly elevated LDL 70 excellent strong panel  Family history of coronary artery disease    Erectile dysfunction due to arterial insufficiency    Gastroesophageal reflux disease without esophagitis  Continue omeprazole  Plantar fasciitis of left foot  Now resolving, no longer on NSAIDs  Arthritis of right ankle  Chronic pain in the right ankle  Gastroenteritis  Try soups clear liquids and fluids times 24 hours, he has Imodium at home.  Actinic keratoses-cryo surgery performed on left cheek lesion today  Follow up in about 6 months (around 2/27/2025) for Diabetic Check-Up.        8/27/2024 Tyler Abel M.D.

## 2024-08-28 ENCOUNTER — PATIENT MESSAGE (OUTPATIENT)
Dept: FAMILY MEDICINE | Facility: CLINIC | Age: 69
End: 2024-08-28
Payer: MEDICARE

## 2025-01-13 DIAGNOSIS — I10 BENIGN ESSENTIAL HTN: ICD-10-CM

## 2025-01-13 RX ORDER — LOSARTAN POTASSIUM 50 MG/1
50 TABLET ORAL DAILY
Qty: 90 TABLET | Refills: 3 | Status: SHIPPED | OUTPATIENT
Start: 2025-01-13

## 2025-01-31 DIAGNOSIS — E11.9 TYPE 2 DIABETES MELLITUS WITHOUT COMPLICATION, WITHOUT LONG-TERM CURRENT USE OF INSULIN: ICD-10-CM

## 2025-01-31 RX ORDER — DAPAGLIFLOZIN 5 MG/1
5 TABLET, FILM COATED ORAL DAILY
Qty: 90 TABLET | Refills: 3 | Status: SHIPPED | OUTPATIENT
Start: 2025-01-31

## 2025-01-31 NOTE — TELEPHONE ENCOUNTER
----- Message from Rekha sent at 1/31/2025  8:36 AM CST -----  Refill on dapagliflozin propanediol  Scripts   [phone]

## 2025-02-20 ENCOUNTER — TELEPHONE (OUTPATIENT)
Dept: FAMILY MEDICINE | Facility: CLINIC | Age: 70
End: 2025-02-20
Payer: MEDICARE

## 2025-03-10 ENCOUNTER — OFFICE VISIT (OUTPATIENT)
Dept: FAMILY MEDICINE | Facility: CLINIC | Age: 70
End: 2025-03-10
Payer: MEDICARE

## 2025-03-10 VITALS
OXYGEN SATURATION: 98 % | SYSTOLIC BLOOD PRESSURE: 136 MMHG | DIASTOLIC BLOOD PRESSURE: 80 MMHG | HEIGHT: 72 IN | WEIGHT: 227 LBS | BODY MASS INDEX: 30.75 KG/M2 | HEART RATE: 68 BPM

## 2025-03-10 DIAGNOSIS — N52.01 ERECTILE DYSFUNCTION DUE TO ARTERIAL INSUFFICIENCY: ICD-10-CM

## 2025-03-10 DIAGNOSIS — Z95.1 STATUS POST THREE VESSEL CORONARY ARTERY BYPASS: ICD-10-CM

## 2025-03-10 DIAGNOSIS — E11.9 TYPE 2 DIABETES MELLITUS WITHOUT COMPLICATION, WITHOUT LONG-TERM CURRENT USE OF INSULIN: ICD-10-CM

## 2025-03-10 DIAGNOSIS — K21.9 GASTROESOPHAGEAL REFLUX DISEASE WITHOUT ESOPHAGITIS: ICD-10-CM

## 2025-03-10 DIAGNOSIS — D75.1 POLYCYTHEMIA: ICD-10-CM

## 2025-03-10 DIAGNOSIS — Z82.49 FAMILY HISTORY OF CORONARY ARTERY DISEASE: ICD-10-CM

## 2025-03-10 DIAGNOSIS — I10 BENIGN ESSENTIAL HTN: ICD-10-CM

## 2025-03-10 DIAGNOSIS — L57.0 ACTINIC KERATOSES: ICD-10-CM

## 2025-03-10 DIAGNOSIS — G47.33 OSA ON CPAP: ICD-10-CM

## 2025-03-10 DIAGNOSIS — E11.9 TYPE 2 DIABETES MELLITUS WITHOUT COMPLICATION, WITHOUT LONG-TERM CURRENT USE OF INSULIN: Primary | ICD-10-CM

## 2025-03-10 DIAGNOSIS — E78.2 MIXED HYPERLIPIDEMIA: ICD-10-CM

## 2025-03-10 PROCEDURE — 4010F ACE/ARB THERAPY RXD/TAKEN: CPT | Mod: CPTII,S$GLB,, | Performed by: FAMILY MEDICINE

## 2025-03-10 PROCEDURE — 99214 OFFICE O/P EST MOD 30 MIN: CPT | Mod: S$GLB,,, | Performed by: FAMILY MEDICINE

## 2025-03-10 PROCEDURE — 3044F HG A1C LEVEL LT 7.0%: CPT | Mod: CPTII,S$GLB,, | Performed by: FAMILY MEDICINE

## 2025-03-10 PROCEDURE — 3061F NEG MICROALBUMINURIA REV: CPT | Mod: CPTII,S$GLB,, | Performed by: FAMILY MEDICINE

## 2025-03-10 PROCEDURE — 3066F NEPHROPATHY DOC TX: CPT | Mod: CPTII,S$GLB,, | Performed by: FAMILY MEDICINE

## 2025-03-10 PROCEDURE — 3008F BODY MASS INDEX DOCD: CPT | Mod: CPTII,S$GLB,, | Performed by: FAMILY MEDICINE

## 2025-03-10 PROCEDURE — 3075F SYST BP GE 130 - 139MM HG: CPT | Mod: CPTII,S$GLB,, | Performed by: FAMILY MEDICINE

## 2025-03-10 PROCEDURE — 3079F DIAST BP 80-89 MM HG: CPT | Mod: CPTII,S$GLB,, | Performed by: FAMILY MEDICINE

## 2025-03-10 PROCEDURE — 1159F MED LIST DOCD IN RCRD: CPT | Mod: CPTII,S$GLB,, | Performed by: FAMILY MEDICINE

## 2025-03-10 RX ORDER — DAPAGLIFLOZIN 5 MG/1
5 TABLET, FILM COATED ORAL DAILY
Qty: 90 TABLET | Refills: 3 | Status: SHIPPED | OUTPATIENT
Start: 2025-03-10

## 2025-03-10 RX ORDER — METFORMIN HYDROCHLORIDE 750 MG/1
750 TABLET, EXTENDED RELEASE ORAL 2 TIMES DAILY WITH MEALS
Qty: 180 TABLET | Refills: 3 | Status: SHIPPED | OUTPATIENT
Start: 2025-03-10 | End: 2025-09-06

## 2025-03-10 RX ORDER — LOSARTAN POTASSIUM 50 MG/1
50 TABLET ORAL DAILY
Qty: 90 TABLET | Refills: 3 | Status: SHIPPED | OUTPATIENT
Start: 2025-03-10

## 2025-03-10 RX ORDER — OMEPRAZOLE 20 MG/1
20 CAPSULE, DELAYED RELEASE ORAL DAILY
Qty: 90 CAPSULE | Refills: 3 | Status: SHIPPED | OUTPATIENT
Start: 2025-03-10 | End: 2025-09-06

## 2025-03-10 RX ORDER — ROSUVASTATIN CALCIUM 20 MG/1
20 TABLET, COATED ORAL DAILY
Qty: 90 TABLET | Refills: 3 | Status: SHIPPED | OUTPATIENT
Start: 2025-03-10

## 2025-03-10 NOTE — PROGRESS NOTES
"  SUBJECTIVE:    Patient ID: Jacob Lou is a 69 y.o. male.    Chief Complaint: Diabetes (Went over meds verbally, review  Lab-results, pt will schedule appt for Eye exam Dr Colten Oneill, need refills, muscle stiffness, abc )    Diabetes  Pertinent negatives for hypoglycemia include no confusion or headaches. Pertinent negatives for diabetes include no chest pain, no polydipsia, no polyuria and no weakness.       History of Present Illness    CHIEF COMPLAINT:  Jacob presents today for follow up.    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 He reported feeling unwell with a racing heart that lasted approximately two hours, which resolved with rest, hydration, and breathing exercises. He has a history of heart bypass surgery and takes daily aspirin. A stress test is scheduled for summer, with an upcoming cardiology appointment on April 15th.    PHYSICAL ACTIVITY AND EXERCISE:  He averages over 3 miles of walking daily and can perform 30 minutes of exercise without adverse effects. He maintains regular physical activities including yard work, lawn mowing, weed eating, and gardening, though notes some decrease in stamina requiring more frequent breaks.    SLEEP:  He sleeps well with CPAP, using it nightly for at least 7 hours.    CURRENT MEDICATIONS:  He takes Rosuvastatin (Crestor) for cholesterol management with tolerable muscle aches, occasionally requiring ibuprofen for relief. He expresses reluctance to increase the dosage due to side effects. He also takes Metformin with occasional "bad days" but reports his body has adapted well overall.    MUSCULOSKELETAL:  He has ongoing issues with his right foot following reconstruction, noting reactivity to barometric pressure changes due to a titanium plate in the heel bone. He uses ibuprofen occasionally for foot pain flares.    GASTROINTESTINAL:  Last colonoscopy in 2023 " revealed multiple small polyps.    DERMATOLOGIC:  Recent dermatology visit with Dr. Cantrell last month showed few small spots requiring cryotherapy, with overall reassuring exam and no areas requiring excision.      ROS:  Constitutional: -appetite change, -chills, -fatigue, -fever, -unexpected weight change  HENT: -ear pain, -trouble swallowing  Eyes: -pain, -discharge, -visual disturbance  Respiratory: -apnea, -cough, -shortness of breath, -wheezing  Cardiovascular: -chest pain, -leg swelling  Gastrointestinal: -abdominal pain, -blood in stool, -constipation, -diarrhea, -nausea, -vomiting, -reflux  Endocrine: -cold intolerance, -heat intolerance, -polydipsia  Genitourinary: -bladder incontinence, -dysuria, -erectile dysfunction, -frequency, -hematuria, -testicular pain, -urgency, -nocturia  Musculoskeletal: -gait problem, -joint swelling, +myalgia  Neurological: -dizziness, -seizures, -numbness  Psychiatric/Behavioral: -agitation, -hallucinations, -nervous, -anxiety symptoms         No visits with results within 6 Month(s) from this visit.   Latest known visit with results is:   Office Visit on 08/27/2024   Component Date Value Ref Range Status    Glucose 03/05/2025 124 (H)  65 - 99 mg/dL Final    BUN 03/05/2025 14  7 - 25 mg/dL Final    Creatinine 03/05/2025 0.93  0.70 - 1.28 mg/dL Final    eGFR 03/05/2025 88  > OR = 60 mL/min/1.73m2 Final    BUN/Creatinine Ratio 03/05/2025 SEE NOTE:  6 - 22 (calc) Final    Sodium 03/05/2025 139  135 - 146 mmol/L Final    Potassium 03/05/2025 4.4  3.5 - 5.3 mmol/L Final    Chloride 03/05/2025 102  98 - 110 mmol/L Final    CO2 03/05/2025 28  20 - 32 mmol/L Final    Calcium 03/05/2025 9.5  8.6 - 10.3 mg/dL Final    Total Protein 03/05/2025 7.6  6.1 - 8.1 g/dL Final    Albumin 03/05/2025 4.7  3.6 - 5.1 g/dL Final    Globulin, Total 03/05/2025 2.9  1.9 - 3.7 g/dL (calc) Final    Albumin/Globulin Ratio 03/05/2025 1.6  1.0 - 2.5 (calc) Final    Total Bilirubin 03/05/2025 0.8  0.2 - 1.2  mg/dL Final    Alkaline Phosphatase 03/05/2025 58  35 - 144 U/L Final    AST 03/05/2025 19  10 - 35 U/L Final    ALT 03/05/2025 22  9 - 46 U/L Final    Hemoglobin A1C 03/05/2025 6.2 (H)  <5.7 % of total Hgb Final    Cholesterol 03/05/2025 148  <200 mg/dL Final    HDL 03/05/2025 44  > OR = 40 mg/dL Final    Triglycerides 03/05/2025 176 (H)  <150 mg/dL Final    LDL Cholesterol 03/05/2025 77  mg/dL (calc) Final    HDL/Cholesterol Ratio 03/05/2025 3.4  <5.0 (calc) Final    Non HDL Chol. (LDL+VLDL) 03/05/2025 104  <130 mg/dL (calc) Final    Creatinine, Urine 03/05/2025 149  20 - 320 mg/dL Final    Microalb, Ur 03/05/2025 0.4  See Note: mg/dL Final    Microalb/Creat Ratio 03/05/2025 3  <30 mg/g creat Final    WBC 03/05/2025 4.8  3.8 - 10.8 Thousand/uL Final    RBC 03/05/2025 5.48  4.20 - 5.80 Million/uL Final    Hemoglobin 03/05/2025 17.4 (H)  13.2 - 17.1 g/dL Final    Hematocrit 03/05/2025 51.2 (H)  38.5 - 50.0 % Final    MCV 03/05/2025 93.4  80.0 - 100.0 fL Final    MCH 03/05/2025 31.8  27.0 - 33.0 pg Final    MCHC 03/05/2025 34.0  32.0 - 36.0 g/dL Final    RDW 03/05/2025 12.7  11.0 - 15.0 % Final    Platelets 03/05/2025 222  140 - 400 Thousand/uL Final    MPV 03/05/2025 10.4  7.5 - 12.5 fL Final    Neutrophils, Abs 03/05/2025 1,853  1,500 - 7,800 cells/uL Final    Lymph # 03/05/2025 2,054  850 - 3,900 cells/uL Final    Mono # 03/05/2025 557  200 - 950 cells/uL Final    Eos # 03/05/2025 298  15 - 500 cells/uL Final    Baso # 03/05/2025 38  0 - 200 cells/uL Final    Neutrophils Relative 03/05/2025 38.6  % Final    Lymph % 03/05/2025 42.8  % Final    Mono % 03/05/2025 11.6  % Final    Eosinophil % 03/05/2025 6.2  % Final    Basophil % 03/05/2025 0.8  % Final    TSH w/reflex to FT4 03/05/2025 1.21  0.40 - 4.50 mIU/L Final    PROSTATE SPECIFIC ANTIGEN, SCR - Q* 03/05/2025 1.31  < OR = 4.00 ng/mL Final       Past Medical History:   Diagnosis Date    GERD (gastroesophageal reflux disease)     Hyperlipidemia     Squamous cell  carcinoma of skin      Social History[1]  Past Surgical History:   Procedure Laterality Date    cataracts  2019    Dr MARYANN Martinez    COLONOSCOPY N/A 11/30/2017    Procedure: COLONOSCOPY;  Surgeon: Maikel Medina MD;  Location: [facility];  Service: Endoscopy;  Laterality: N/A;    CORONARY ARTERY BYPASS GRAFT  11/02/2020    Dr Mims- 3 vessel    EYE SURGERY  April 2019    FOOT SURGERY      HERNIA REPAIR       Family History   Problem Relation Name Age of Onset    Diabetes Mother F F Marcos     Hypertension Father         The 10-year CVD risk score (LEEANNEAgoino et al., 2008) is: 32.9%    Values used to calculate the score:      Age: 69 years      Sex: Male      Diabetic: Yes      Tobacco smoker: No      Systolic Blood Pressure: 136 mmHg      Is BP treated: No      HDL Cholesterol: 44 mg/dL      Total Cholesterol: 148 mg/dL    Tests to Keep You Healthy    Eye Exam: DUE  Colon Cancer Screening: Met on 11/30/2017  Last HbA1c < 8 (03/05/2025): Yes      Review of patient's allergies indicates:  No Known Allergies  Current Medications[2]    Review of Systems   Constitutional:  Negative for activity change and unexpected weight change.   HENT:  Positive for rhinorrhea. Negative for hearing loss and trouble swallowing.    Eyes:  Negative for discharge and visual disturbance.   Respiratory:  Negative for chest tightness and wheezing.    Cardiovascular:  Negative for chest pain and palpitations.   Gastrointestinal:  Positive for diarrhea and vomiting. Negative for blood in stool and constipation.   Endocrine: Negative for polydipsia and polyuria.   Genitourinary:  Negative for difficulty urinating, hematuria and urgency.   Musculoskeletal:  Positive for arthralgias. Negative for joint swelling and neck pain.   Neurological:  Negative for weakness and headaches.   Psychiatric/Behavioral:  Negative for confusion and dysphoric mood.            Objective:      Vitals:    03/10/25 0842   BP: 136/80   Pulse: 68   SpO2: 98%   Weight:  103 kg (227 lb)   Height: 6' (1.829 m)     Physical Exam  Vitals and nursing note reviewed.   Constitutional:       General: He is not in acute distress.     Appearance: Normal appearance. He is well-developed. He is not toxic-appearing.   HENT:      Head: Normocephalic and atraumatic.      Right Ear: Tympanic membrane and external ear normal.      Left Ear: Tympanic membrane and external ear normal.      Nose: Nose normal.      Mouth/Throat:      Pharynx: Oropharynx is clear. No posterior oropharyngeal erythema.   Eyes:      Pupils: Pupils are equal, round, and reactive to light.   Neck:      Thyroid: No thyromegaly.      Vascular: No carotid bruit.   Cardiovascular:      Rate and Rhythm: Normal rate and regular rhythm.      Heart sounds: Normal heart sounds. No murmur heard.  Pulmonary:      Effort: Pulmonary effort is normal.      Breath sounds: Normal breath sounds. No wheezing or rales.   Abdominal:      General: Bowel sounds are normal. There is no distension.      Palpations: Abdomen is soft.      Tenderness: There is no abdominal tenderness.   Musculoskeletal:         General: No tenderness or deformity. Normal range of motion.      Cervical back: Normal range of motion and neck supple.      Lumbar back: Normal. No spasms.      Comments: Bends 90 degrees at  waist, shoulders and knees have full range of motion, no pitting edema to lower extremities   Lymphadenopathy:      Cervical: No cervical adenopathy.   Skin:     General: Skin is warm and dry.      Findings: No rash.   Neurological:      General: No focal deficit present.      Mental Status: He is alert and oriented to person, place, and time. Mental status is at baseline.      Cranial Nerves: No cranial nerve deficit.      Coordination: Coordination normal.      Gait: Gait normal.   Psychiatric:         Mood and Affect: Mood normal.         Behavior: Behavior normal.         Thought Content: Thought content normal.         Judgment: Judgment normal.        Physical Exam                  Assessment:       1. Type 2 diabetes mellitus without complication, without long-term current use of insulin    2. Benign essential HTN    3. Type 2 diabetes mellitus without complication, without long-term current use of insulin    4. Gastroesophageal reflux disease without esophagitis    5. Mixed hyperlipidemia    6. Actinic keratoses    7. Status post three vessel coronary artery bypass    8. Family history of coronary artery disease    9. Erectile dysfunction due to arterial insufficiency    10. JEANIE on CPAP    11. Polycythemia         Plan:       Type 2 diabetes mellitus without complication, without long-term current use of insulin  -     dapagliflozin propanediol (FARXIGA) 5 mg Tab tablet; Take 1 tablet (5 mg total) by mouth once daily.  Dispense: 90 tablet; Refill: 3  -     metFORMIN (GLUCOPHAGE-XR) 750 MG ER 24hr tablet; Take 1 tablet (750 mg total) by mouth 2 (two) times daily with meals.  Dispense: 180 tablet; Refill: 3  A1c well controlled at 6.2 continue daily regimen  Benign essential HTN  -     losartan (COZAAR) 50 MG tablet; Take 1 tablet (50 mg total) by mouth once daily.  Dispense: 90 tablet; Refill: 3  Blood pressure well controlled  Type 2 diabetes mellitus without complication, without long-term current use of insulin  Comments:  Greatly improved to 6.2% today. NO changes  Orders:  -     dapagliflozin propanediol (FARXIGA) 5 mg Tab tablet; Take 1 tablet (5 mg total) by mouth once daily.  Dispense: 90 tablet; Refill: 3  -     metFORMIN (GLUCOPHAGE-XR) 750 MG ER 24hr tablet; Take 1 tablet (750 mg total) by mouth 2 (two) times daily with meals.  Dispense: 180 tablet; Refill: 3    Gastroesophageal reflux disease without esophagitis  Comments:  Doing well with omeprazole. continue as is.  Orders:  -     omeprazole (PRILOSEC) 20 MG capsule; Take 1 capsule (20 mg total) by mouth once daily.  Dispense: 90 capsule; Refill: 3    Mixed hyperlipidemia  -     rosuvastatin  (CRESTOR) 20 MG tablet; Take 1 tablet (20 mg total) by mouth once daily.  Dispense: 90 tablet; Refill: 3  Cholesterol 148 HDL 44  LDL 77, continue rosuvastatin  Actinic keratoses  Status post cryo surgery with Dr. Cantrell  Status post three vessel coronary artery bypass  Doing well, follow-up with Dr. Hill  Family history of coronary artery disease    Erectile dysfunction due to arterial insufficiency    JEANIE on CPAP  Compliant with CPAP 7 hours per night  Polycythemia  Hematocrit up to 51.  Continue aspirin 81 mg daily.  Polycythemia probably due to the JEANIE.    Assessment & Plan    IMPRESSION:  - Assessed patient's overall health status, noting good physical activity levels and ability to perform daily tasks  - Evaluated recent episode of atrial fibrillation (11/20), which resolved within 2 hours  - Considered patient's complaint of muscle aches related to statin use  - Reviewed recent lab results, noting slightly elevated hematocrit (51) despite CPAP compliance  - Evaluated LDL level (77), which is close to target of 70  - Considered options for further lowering LDL without exacerbating muscle aches  - Noted normal PSA (1.3) and favorable cholesterol levels (148)  - Reviewed recent dermatology visit with Dr. Cantrell, who found no concerning skin lesions    ATRIAL FIBRILLATION:  - Noted the patient experienced an episode of atrial fibrillation on November 20th, lasting approximately 2 hours, with symptoms of racing and irregular heartbeat.  - Confirmed irregular heartbeat during the episode via patient's smartwatch.  - Inquired about the frequency of such episodes.  - Continued daily aspirin for heart-related issues.  - Advised to follow up with cardiologist Dr. Hill at upcoming appointment on April 15th.  - Ordered stress test for summer.  - Noted patient underwent bypass surgery approximately 5 years ago.  - Scheduled a stress test for the patient.    SLEEP APNEA:  - Noted patient uses CPAP machine nightly  for at least 7 hours and reports sleeping very well with the device.  - Observed elevated hematocrit of 51, which can be associated with sleep apnea.  - Explained potential causes of elevated hematocrit, including sleep apnea.    HYPERLIPIDEMIA:  - Noted patient experiences aches and pains from statin medication.  - Evaluated cholesterol level at 148, with LDL slightly above target at 77.  - Discussed newer cholesterol medications with potentially fewer side effects, such as Nexletol and Repatha.  - Explained Zetia as a non-statin option that could be added to current regimen.  - Continued Rosuvastatin (Crestor) at current dose.  - Advised to contact the office to discuss medication options after cardiology appointment.  - Suggested discussing alternative medications with cardiologist.    DIABETES:  - Continued Metformin for diabetes management.    ELEVATED HEMATOCRIT:  - Noted patient's hematocrit is elevated at 51.  - Explained potential causes of elevated hematocrit, including sleep apnea.    FOOT PAIN:  - Noted patient experiences ongoing issues with reconstructed right foot, including pain related to barometric pressure changes.  - Advised to use ibuprofen for pain management when needed.  - Noted patient has a titanium plate in the heel bone from previous foot reconstruction.    DERMATOLOGY:  - Noted patient had a recent dermatology follow-up with Dr. Cantrell.  - Reported dermatologist found a few small spots that required treatment.  - Performed cryotherapy on small skin lesions.    COLON HEALTH:  - Reviewed results of patient's last colonoscopy in 2023, which showed multiple small, benign polyps.  - Noted gastroenterologist (Dr. Mao) determined no further colonoscopies are needed based on the last results.         Follow up in about 6 months (around 9/10/2025), or cabg, for Diabetic Check-Up.        This note was generated with the assistance of ambient listening technology. Verbal consent was obtained by  the patient and accompanying visitor(s) for the recording of patient appointment to facilitate this note. I attest to having reviewed and edited the generated note for accuracy, though some syntax or spelling errors may persist. Please contact the author of this note for any clarification.      3/10/2025 Tyler Abel           [1]   Social History  Socioeconomic History    Marital status:    Tobacco Use    Smoking status: Never    Smokeless tobacco: Never   Substance and Sexual Activity    Alcohol use: Yes     Alcohol/week: 1.0 standard drink of alcohol     Types: 1 Glasses of wine per week    Drug use: No    Sexual activity: Yes     Partners: Female     Social Drivers of Health     Financial Resource Strain: Patient Declined (2/20/2024)    Overall Financial Resource Strain (CARDIA)     Difficulty of Paying Living Expenses: Patient declined   Food Insecurity: Patient Declined (2/20/2024)    Hunger Vital Sign     Worried About Running Out of Food in the Last Year: Patient declined     Ran Out of Food in the Last Year: Patient declined   Transportation Needs: Patient Declined (2/20/2024)    PRAPARE - Transportation     Lack of Transportation (Medical): Patient declined     Lack of Transportation (Non-Medical): Patient declined   Physical Activity: Insufficiently Active (2/20/2024)    Exercise Vital Sign     Days of Exercise per Week: 4 days     Minutes of Exercise per Session: 20 min   Stress: No Stress Concern Present (2/20/2024)    [employer] - Occupational Stress Questionnaire     Feeling of Stress : Only a little   Housing Stability: Patient Declined (2/20/2024)    Housing Stability Vital Sign     Unable to Pay for Housing in the Last Year: Patient declined     Unstable Housing in the Last Year: Patient declined   [2]   Current Outpatient Medications:     ascorbic acid, vitamin C, 500 mg Cap, ascorbic acid (vitamin C) 500 mg capsule  Take 2 capsules every day by oral route.,  Disp: , Rfl:     aspirin (ECOTRIN) 81 MG EC tablet, Take 81 mg by mouth once daily., Disp: , Rfl:     blood sugar diagnostic Strp, To check BG 1 times daily, to use with insurance preferred meter, Disp: 100 strip, Rfl: 0    coenzyme Q10 100 mg capsule, Co Q-10 100 mg capsule  Take 1 capsule every day by oral route., Disp: , Rfl:     ibuprofen (ADVIL,MOTRIN) 200 MG tablet, Take 200 mg by mouth every 8 (eight) hours as needed for Pain., Disp: , Rfl:     KRILL OIL ORAL, Take by mouth once daily., Disp: , Rfl:     lancets Misc, To check BG 1 times daily, to use with insurance preferred meter, Disp: 100 each, Rfl: 1    amoxicillin (AMOXIL) 500 MG capsule, Take 1 capsule (500 mg total) by mouth 3 (three) times daily. (Patient not taking: Reported on 8/27/2024), Disp: 30 capsule, Rfl: 0    blood-glucose meter kit, To check BG 1 times daily, to use with insurance preferred meter, Disp: 1 each, Rfl: 0    dapagliflozin propanediol (FARXIGA) 5 mg Tab tablet, Take 1 tablet (5 mg total) by mouth once daily., Disp: 90 tablet, Rfl: 3    losartan (COZAAR) 50 MG tablet, Take 1 tablet (50 mg total) by mouth once daily., Disp: 90 tablet, Rfl: 3    metFORMIN (GLUCOPHAGE-XR) 750 MG ER 24hr tablet, Take 1 tablet (750 mg total) by mouth 2 (two) times daily with meals., Disp: 180 tablet, Rfl: 3    omeprazole (PRILOSEC) 20 MG capsule, Take 1 capsule (20 mg total) by mouth once daily., Disp: 90 capsule, Rfl: 3    promethazine (PHENERGAN) 25 MG tablet, Take 25 mg by mouth., Disp: , Rfl:     rosuvastatin (CRESTOR) 20 MG tablet, Take 1 tablet (20 mg total) by mouth once daily., Disp: 90 tablet, Rfl: 3

## 2025-07-07 DIAGNOSIS — E78.2 MIXED HYPERLIPIDEMIA: ICD-10-CM

## 2025-07-07 RX ORDER — ROSUVASTATIN CALCIUM 20 MG/1
20 TABLET, COATED ORAL DAILY
Qty: 90 TABLET | Refills: 3 | Status: SHIPPED | OUTPATIENT
Start: 2025-07-07

## 2025-07-07 NOTE — TELEPHONE ENCOUNTER
----- Message from Maryana sent at 7/7/2025 12:16 PM CDT -----  Pt requests refill Rosuvastatin for at least 10 days worth.  [facility]  [phone]

## 2025-08-28 ENCOUNTER — TELEPHONE (OUTPATIENT)
Dept: FAMILY MEDICINE | Facility: CLINIC | Age: 70
End: 2025-08-28
Payer: MEDICARE